# Patient Record
Sex: FEMALE | Race: WHITE | NOT HISPANIC OR LATINO | Employment: STUDENT | URBAN - METROPOLITAN AREA
[De-identification: names, ages, dates, MRNs, and addresses within clinical notes are randomized per-mention and may not be internally consistent; named-entity substitution may affect disease eponyms.]

---

## 2017-01-17 ENCOUNTER — HOSPITAL ENCOUNTER (EMERGENCY)
Facility: OTHER | Age: 22
Discharge: HOME | End: 2017-01-17
Attending: EMERGENCY MEDICINE
Payer: COMMERCIAL

## 2017-01-17 VITALS
OXYGEN SATURATION: 95 % | BODY MASS INDEX: 29.8 KG/M2 | WEIGHT: 220 LBS | TEMPERATURE: 98 F | HEART RATE: 90 BPM | HEIGHT: 72 IN | SYSTOLIC BLOOD PRESSURE: 126 MMHG | RESPIRATION RATE: 18 BRPM | DIASTOLIC BLOOD PRESSURE: 78 MMHG

## 2017-01-17 DIAGNOSIS — J02.9 PHARYNGITIS, UNSPECIFIED ETIOLOGY: Primary | ICD-10-CM

## 2017-01-17 LAB
B-HCG UR QL: NEGATIVE
CTP QC/QA: YES

## 2017-01-17 PROCEDURE — 99283 EMERGENCY DEPT VISIT LOW MDM: CPT | Mod: 25

## 2017-01-17 PROCEDURE — 81025 URINE PREGNANCY TEST: CPT | Performed by: EMERGENCY MEDICINE

## 2017-01-17 PROCEDURE — 63600175 PHARM REV CODE 636 W HCPCS: Performed by: EMERGENCY MEDICINE

## 2017-01-17 PROCEDURE — 96372 THER/PROPH/DIAG INJ SC/IM: CPT

## 2017-01-17 RX ORDER — DEXAMETHASONE SODIUM PHOSPHATE 4 MG/ML
8 INJECTION, SOLUTION INTRA-ARTICULAR; INTRALESIONAL; INTRAMUSCULAR; INTRAVENOUS; SOFT TISSUE
Status: COMPLETED | OUTPATIENT
Start: 2017-01-17 | End: 2017-01-17

## 2017-01-17 RX ORDER — BUSPIRONE HYDROCHLORIDE 15 MG/1
15 TABLET ORAL 3 TIMES DAILY
COMMUNITY
End: 2018-02-25

## 2017-01-17 RX ORDER — ESCITALOPRAM OXALATE 20 MG/1
20 TABLET ORAL DAILY
COMMUNITY
End: 2018-02-25

## 2017-01-17 RX ADMIN — DEXAMETHASONE SODIUM PHOSPHATE 8 MG: 4 INJECTION, SOLUTION INTRAMUSCULAR; INTRAVENOUS at 11:01

## 2017-01-17 NOTE — ED PROVIDER NOTES
Encounter Date: 1/17/2017    SCRIBE #1 NOTE: I, Elena Schmitz, am scribing for, and in the presence of, Dr. Gray.       History     Chief Complaint   Patient presents with    Sore Throat     pt reports waking up this morning with a sore throat; denies any cough; pt reports being an  and has to perform tonight and wanted to make sure she didn't have strep or anything else     Review of patient's allergies indicates:  No Known Allergies  HPI Comments: Time seen by provider: 10:28 AM    This is a 21 y.o. female who presents with complaint of sore throat. The pain began upon waking four hours ago and has been constant since. She rates the pain 2 or 3/10. She reports using a salt gurgle, hot tea, and honey with some relief. She denies taking ibuprofen or aspirin for concern of vocal hemorrhage. She is an , and she is performing for the next three nights. She is concerned she may have strep throat because she had multiple episodes of it as a child. She complains of associated mild headache but denies fever and cough. She took antibiotics for a sinus infection one month ago. She reports sick contacts with her boyfriend.     The history is provided by the patient.     Past Medical History   Diagnosis Date    Anxiety     Bipolar 1 disorder      No past medical history pertinent negatives.  Past Surgical History   Procedure Laterality Date    Temporomandibular joint surgery       History reviewed. No pertinent family history.  Social History   Substance Use Topics    Smoking status: Never Smoker    Smokeless tobacco: None    Alcohol use Yes     Review of Systems   Constitutional: Negative for fever.   HENT: Positive for sore throat.    Respiratory: Negative for cough.    Cardiovascular: Negative for chest pain.   Gastrointestinal: Negative for abdominal pain, nausea and vomiting.   Endocrine: Negative for polyuria.   Genitourinary: Negative for dysuria.   Musculoskeletal: Negative for myalgias.    Skin: Negative for rash.   Neurological: Positive for headaches.       Physical Exam   Initial Vitals   BP Pulse Resp Temp SpO2   01/17/17 0955 01/17/17 0955 01/17/17 0955 01/17/17 0955 01/17/17 0955   137/81 99 18 98.4 °F (36.9 °C) 96 %     Physical Exam    Nursing note and vitals reviewed.  Constitutional: She appears well-developed and well-nourished. She is not diaphoretic. No distress.   HENT:   Head: Normocephalic and atraumatic.   Right Ear: External ear normal.   Left Ear: External ear normal.   Nose: Nose normal.   Mouth/Throat: No oropharyngeal exudate.   Erythema and significant cobblestoning of the posterior oropharynx. Midline uvula.   Eyes: Conjunctivae and EOM are normal.   Neck: Normal range of motion.   Cardiovascular: Normal rate, regular rhythm and normal heart sounds. Exam reveals no gallop and no friction rub.    No murmur heard.  Pulmonary/Chest: Breath sounds normal. No respiratory distress. She has no wheezes. She has no rhonchi. She has no rales.   Abdominal: Soft. There is no tenderness. There is no rebound and no guarding.   Musculoskeletal: Normal range of motion. She exhibits no edema or tenderness.   Lymphadenopathy:     She has no cervical adenopathy.   Neurological: She is alert and oriented to person, place, and time.   Skin: Skin is warm and dry. No rash noted. No pallor.   Psychiatric: She has a normal mood and affect. Thought content normal.         ED Course   Procedures  Labs Reviewed   POCT URINE PREGNANCY             Medical Decision Making:   ED Management:  Urgent evaluation a 21-year-old female  with complaint of sore throat ×2 hours.  Vital signs are benign, afebrile.  On exam there is no evidence of tonsillitis or peritonsillar abscess, only mild erythema and mild cobblestoning.  I advised her that this is unlikely to be strep throat given her normal vital signs and exam.  She agrees.  She was to avoid ibuprofen for fear of vocal cord hemorrhage due to her  occupation, and was instead treated with Tylenol prescription.  She was given a dexamethasone shot here which is what she desired.  She is discharged in good condition and encouraged to return for any new or worsening symptoms.            Scribe Attestation:   Scribe #1: I performed the above scribed service and the documentation accurately describes the services I performed. I attest to the accuracy of the note.    Attending Attestation:           Physician Attestation for Scribe:  Physician Attestation Statement for Scribe #1: I, Dr. Gray, reviewed documentation, as scribed by Elena Schmitz in my presence, and it is both accurate and complete.                 ED Course     Clinical Impression:     1. Pharyngitis, unspecified etiology             Carisa Gray MD  01/19/17 9861

## 2017-01-17 NOTE — ED AVS SNAPSHOT
[facility]  [address]  [facility] [address]               Candace Tsang   2017 10:07 AM   ED    Description:  Female : 1995   Department:  [facility]           Your Care was Coordinated By:     Provider Role From To    Carisa Gray MD Attending Provider 17 1011 --      Reason for Visit     Sore Throat           Diagnoses this Visit        Comments    Pharyngitis, unspecified etiology    -  Primary       ED Disposition     None           To Do List           Follow-up Information     Schedule an appointment as soon as possible for a visit with [facility].    Why:  As needed    Contact information:    [address]  [facility] [address]  [phone]          Follow up with [facility].    Specialty:  Emergency Medicine    Why:  As needed, If symptoms worsen    Contact information:    [address]  [facility] [address] [phone]      [facility]     [facility] Care [facility] -  Assistance  Registered nurses in the [facility] provide clinical advisement, health education, appointment booking, and other advisory services.  Call for this free service at [phone].             Medications           Message regarding Medications     Verify the changes and/or additions to your medication regime listed below are the same as discussed with your clinician today.  If any of these changes or additions are incorrect, please notify your healthcare provider.        These medications were administered today        Dose Freq    dexamethasone injection 8 mg 8 mg ED 1 Time    Sig: Inject 2 mLs (8 mg total) into the muscle ED 1 Time.    Class: Normal    Route: Intramuscular           Verify that the below list of medications is an accurate representation of the medications you are currently taking.  If none reported, the list may be blank. If incorrect,  "please contact your healthcare provider. Carry this list with you in case of emergency.           Current Medications     busPIRone (BUSPAR) 15 MG tablet Take 15 mg by mouth 3 (three) times daily.    escitalopram oxalate (LEXAPRO) 20 MG tablet Take 20 mg by mouth once daily.    etonogestrel (IMPLANON) 68 mg Impl by Subdermal route.           Clinical Reference Information           Your Vitals Were     BP Pulse Temp Resp Height Weight    137/81 (BP Location: Left arm, Patient Position: Sitting) 99 98.4 °F (36.9 °C) (Oral) 18 6' 1" (1.854 m) 99.8 kg (220 lb)    SpO2 BMI             96% 29.03 kg/m2         Allergies as of 1/17/2017     No Known Allergies      Immunizations Administered on Date of Encounter - 1/17/2017     None      ED Micro, Lab, POCT     Start Ordered       Status Ordering Provider    01/17/17 1055 01/17/17 1054  POCT urine pregnancy  Once      Final result       ED Imaging Orders     None      Discharge References/Attachments     SORE THROATS, SELF-CARE FOR (ENGLISH)    SORE THROAT, WHEN YOU HAVE A (ENGLISH)      [facility] Sign-Up     Activating your [facility] account is as easy as 1-2-3!     1) Visit [email or web address], select Sign Up Now, enter this activation code and your date of birth, then select Next.  [email or web address]  Expires: 3/3/2017 11:17 AM      2) Create a username and password to use when you visit [facility] in the future and select a security question in case you lose your password and select Next.    3) Enter your e-mail address and click Sign Up!    Additional Information  If you have questions, please e-mail [email or web address] or call [phone] to talk to our [facility] staff. Remember, [facility] is NOT to be used for urgent needs. For medical emergencies, dial 911.          [facility] complies with applicable Federal civil rights laws and does not discriminate on the basis of race, color, national origin, age, disability, or sex.        Language Assistance " Services     ATTENTION: Language assistance services are available, free of charge. Please call [phone].      ATENCIÓN: Si habla español, tiene a benito disposición servicios gratuitos de asistencia lingüística. Llame al [phone].     CHÚ Ý: N?u b?n nói Ti?ng Vi?t, có các d?ch v? h? tr? ngôn ng? mi?n phí dành cho b?n. G?i s? [phone].

## 2017-01-17 NOTE — ED TRIAGE NOTES
Pt c/o feeling throat & tonsillar swelling upon waking up at 630 this morning. Pt has tried hot fluids with no relief.

## 2017-01-17 NOTE — ED NOTES
Patient Identifiers for Candace Tsang checked and correct  LOC: The patient is awake, alert and aware of environment with an appropriate affect, the patient is oriented x 3 and speaking appropriate.  APPEARANCE: Patient resting comfortably and in no acute distress. The patient is clean and well groomed. The patient's clothing is properly fastened.  SKIN: The skin is warm and dry. The patient has normal skin turgor and moist mucus membranes.  Musculoskeletal :  Normal range of motion noted. Moves all extremities well.  RESPIRATORY: Airway is open and patent, respirations are spontaneous, patient has a normal effort and rate.   PULSES: 2+ radial & pedal pulses, symmetrical in all extremities.  ENT: Tonsillar erythema observed.  Will continue to monitor

## 2017-05-11 ENCOUNTER — HOSPITAL ENCOUNTER (EMERGENCY)
Facility: OTHER | Age: 22
Discharge: HOME | End: 2017-05-11
Attending: EMERGENCY MEDICINE
Payer: COMMERCIAL

## 2017-05-11 VITALS
RESPIRATION RATE: 20 BRPM | SYSTOLIC BLOOD PRESSURE: 114 MMHG | OXYGEN SATURATION: 96 % | TEMPERATURE: 98 F | DIASTOLIC BLOOD PRESSURE: 66 MMHG | BODY MASS INDEX: 25.73 KG/M2 | HEART RATE: 84 BPM | WEIGHT: 190 LBS | HEIGHT: 72 IN

## 2017-05-11 DIAGNOSIS — S93.401A SPRAIN OF RIGHT ANKLE, UNSPECIFIED LIGAMENT, INITIAL ENCOUNTER: Primary | ICD-10-CM

## 2017-05-11 DIAGNOSIS — T14.90XA TRAUMA: ICD-10-CM

## 2017-05-11 PROCEDURE — 25000003 PHARM REV CODE 250: Performed by: EMERGENCY MEDICINE

## 2017-05-11 PROCEDURE — 99283 EMERGENCY DEPT VISIT LOW MDM: CPT

## 2017-05-11 RX ORDER — IBUPROFEN 800 MG/1
800 TABLET ORAL 3 TIMES DAILY PRN
Qty: 20 TABLET | Refills: 0 | Status: SHIPPED | OUTPATIENT
Start: 2017-05-11 | End: 2019-11-19

## 2017-05-11 RX ORDER — IBUPROFEN 400 MG/1
800 TABLET ORAL
Status: COMPLETED | OUTPATIENT
Start: 2017-05-11 | End: 2017-05-11

## 2017-05-11 RX ADMIN — IBUPROFEN 800 MG: 400 TABLET, FILM COATED ORAL at 02:05

## 2017-05-11 NOTE — ED NOTES
Pt c/o R ankle pain after rolling her ankle when stepping off a curb at a bar. Pt states she heard and felt a crunch. Pt was not able to bear weight after the incident. No LOC. +ETOH. Pt is A & O x 3, denies SOB, fever, chills and N/V/D. Skin is warm, dry and pink. VSS. JOSE x 3mm. BBS- CTA. Abd- SNT. +swelling w/out deformity to the R ankle. No discoloration noted. PMS x 4 exts. Bed is locked and in the low position w/ the side rails up and locked for safety. Call bell @ BS. Will continue to monitor closely.

## 2017-05-11 NOTE — ED PROVIDER NOTES
Encounter Date: 5/11/2017    SCRIBE #1 NOTE: I, Leia Curry , am scribing for, and in the presence of, Dr. Gamino.       History     Chief Complaint   Patient presents with    Ankle Pain     reports right ankle pain secondary to tripping on curb at bar, moderate amount of swelling noted, unable to bear weight      Review of patient's allergies indicates:  No Known Allergies  HPI Comments: Time seen by provider: 1:58 AM    This is a 21 y.o. female who presents with complaint of ankle pain. Symptoms began prior to arrival. Onset of symptoms occurred after the patient tripped on a curb. Right ankle pain is described as constant and moderate. She reports associated swelling, but denies fever, chills, nausea, vomiting, abdominal pain, head trauma, numbness, or pain to the rest of the extremity. Pt is unable to bear weight, and applied ice to the ankle with little relief.       The history is provided by the patient.     Past Medical History:   Diagnosis Date    Anxiety     Bipolar 1 disorder      Past Surgical History:   Procedure Laterality Date    TEMPOROMANDIBULAR JOINT SURGERY       No family history on file.  Social History   Substance Use Topics    Smoking status: Never Smoker    Smokeless tobacco: Not on file    Alcohol use Yes     Review of Systems   Constitutional: Negative for chills and fever.   HENT: Negative for congestion and sore throat.         Negative for head trauma.    Eyes: Negative for redness and visual disturbance.   Respiratory: Negative for cough and shortness of breath.    Cardiovascular: Negative for chest pain and palpitations.   Gastrointestinal: Negative for abdominal pain, diarrhea, nausea and vomiting.   Genitourinary: Negative for dysuria.   Musculoskeletal: Negative for back pain.        Positive for right ankle pain with swelling. Negative for pain to the rest of the RLE.   Skin: Negative for rash.   Neurological: Negative for weakness, numbness and headaches.    Psychiatric/Behavioral: Negative for confusion.       Physical Exam   Initial Vitals   BP Pulse Resp Temp SpO2   05/11/17 0104 05/11/17 0104 05/11/17 0104 05/11/17 0104 05/11/17 0104   105/61 98 20 97.9 °F (36.6 °C) 96 %     Physical Exam    Nursing note and vitals reviewed.  Constitutional: She appears well-developed and well-nourished. She is not diaphoretic. No distress.   HENT:   Head: Normocephalic and atraumatic.   Right Ear: External ear normal.   Left Ear: External ear normal.   Eyes: Conjunctivae and EOM are normal. Pupils are equal, round, and reactive to light. Right eye exhibits no discharge. Left eye exhibits no discharge. No scleral icterus.   Neck: Normal range of motion. Neck supple.   Cardiovascular: Normal rate, regular rhythm, normal heart sounds and intact distal pulses. Exam reveals no gallop and no friction rub.    No murmur heard.  Pulses:       Dorsalis pedis pulses are 2+ on the right side   Pulmonary/Chest: Breath sounds normal. No stridor. No respiratory distress. She has no wheezes. She has no rhonchi. She has no rales.   Abdominal: Soft. She exhibits no distension. There is no tenderness. There is no rebound and no guarding.   Musculoskeletal: Normal range of motion. She exhibits edema and tenderness.   Right ankle: Tenderness and swelling to the lateral malleolus. No obvious deformity. Unable to test laxity. No bony tenderness of the mid-foot or proximal fibula. No other trauma.    Neurological: She is alert and oriented to person, place, and time. She has normal strength. No cranial nerve deficit.   Skin: Skin is warm and dry. No rash noted. No erythema. No pallor.   Psychiatric: She has a normal mood and affect. Her behavior is normal. Judgment and thought content normal.         ED Course   Procedures  Labs Reviewed - No data to display   Imaging Results         X-Ray Tibia Fibula 2 View Right (Final result) Result time:  05/11/17 01:39:18    Final result by Luis Bravo MD  (05/11/17 01:39:18)    Impression:      No acute fracture.      Electronically signed by: LUIS BRAVO MD  Date:     05/11/17  Time:    01:39     Narrative:    History: .    RIGHT tibia and fibula 2 views:    No fractures or dislocations.  Unremarkable visualized bony structures.            X-Ray Ankle Complete Right (Final result) Result time:  05/11/17 01:38:49    Final result by Lobo Chaudhari MD (05/11/17 01:38:49)    Impression:       No evidence of a fracture or dislocation of the right ankle.    Soft tissue swelling of the lateral aspect of the right ankle.              Electronically signed by: LOBO CHAUDHARI MD  Date:     05/11/17  Time:    01:38     Narrative:    Exam: [id number]  05/11/17  01:30:56 [id number] ([facility]) : XR ANKLE COMPLETE 3 VIEW RIGHT    Technique:    Frontal, lateral, and oblique radiographs of the right ankle.    Comparison:     None     Findings:      The bone mineralization is within normal limits.  The joint spaces are maintained.  The ankle mortise is intact.  There is nonspecific soft tissue swelling of the lateral aspect of the right ankle.  There is no evidence of a fracture or dislocation.            X-Ray Foot Complete Right (Final result) Result time:  05/11/17 01:41:04    Final result by Luis Bravo MD (05/11/17 01:41:04)    Impression:      No acute fracture.      Electronically signed by: LUIS BRAVO MD  Date:     05/11/17  Time:    01:41     Narrative:    History: .    RIGHT foot 3 views:    No fractures or dislocations.  Unremarkable visualized bony structures.                   X-Rays:   Independently Interpreted Readings:   Other Readings:  Right ankle: No fracture or dislocation.  Right foot: No fracture or dislocation.  Right fibula: No fracture or dislocation.    Medical Decision Making:   Clinical Tests:   Radiological Study: Ordered and Reviewed  ED Management:  21-year-old  presents after she twisted her ankle while walking out of a bar.  She heard a  ""crunch" and is concerned about possible fracture.  Does have significant swelling especially over the lateral malleolus.  Does not tolerate father laxity testing.  X-ray of tib-fib, ankle, midfoot demonstrates no fracture dislocation.  Likely significant sprain.  Place an Aircast and crutches.  Counseled for early mobility, provided with orthopedic follow-up.    I did have an extensive talk regarding signs to return for and need for follow up. Patient expressed understanding and will monitor symptoms closely and follow-up as needed.    CHIKA Gamino M.D.  05/11/2017  5:06 AM              Scribe Attestation:   Scribe #1: I performed the above scribed service and the documentation accurately describes the services I performed. I attest to the accuracy of the note.    Attending Attestation:           Physician Attestation for Scribe:  Physician Attestation Statement for Scribe #1: I, Dr. Gamino, reviewed documentation, as scribed by Leia Curry  in my presence, and it is both accurate and complete.                 ED Course     Clinical Impression:     1. Sprain of right ankle, unspecified ligament, initial encounter    2. Trauma                Kendall Gamino MD  05/11/17 0507    "

## 2017-05-11 NOTE — ED NOTES
Pt advised to look for S/S to L ankle that could leave to further injury, such as numbness, tingling, delayed capillary refill and cool/cold temperature w/ discoloration of blue/purple. Pt states she understands and states she will return to the ER if any of the above occur and do not cease w/in a reasonable amount of time.

## 2018-02-25 ENCOUNTER — HOSPITAL ENCOUNTER (EMERGENCY)
Facility: OTHER | Age: 23
Discharge: HOME | End: 2018-02-25
Attending: EMERGENCY MEDICINE
Payer: COMMERCIAL

## 2018-02-25 VITALS
TEMPERATURE: 99 F | HEART RATE: 89 BPM | WEIGHT: 240 LBS | OXYGEN SATURATION: 96 % | BODY MASS INDEX: 32.51 KG/M2 | SYSTOLIC BLOOD PRESSURE: 111 MMHG | DIASTOLIC BLOOD PRESSURE: 77 MMHG | RESPIRATION RATE: 16 BRPM | HEIGHT: 72 IN

## 2018-02-25 DIAGNOSIS — M54.50 LUMBAR PAIN: ICD-10-CM

## 2018-02-25 DIAGNOSIS — S39.012A STRAIN OF LUMBAR REGION, INITIAL ENCOUNTER: Primary | ICD-10-CM

## 2018-02-25 LAB
B-HCG UR QL: NEGATIVE
CTP QC/QA: YES

## 2018-02-25 PROCEDURE — 25000003 PHARM REV CODE 250: Performed by: PHYSICIAN ASSISTANT

## 2018-02-25 PROCEDURE — 99284 EMERGENCY DEPT VISIT MOD MDM: CPT | Mod: 25

## 2018-02-25 PROCEDURE — 96372 THER/PROPH/DIAG INJ SC/IM: CPT

## 2018-02-25 PROCEDURE — 81025 URINE PREGNANCY TEST: CPT | Performed by: EMERGENCY MEDICINE

## 2018-02-25 PROCEDURE — 63600175 PHARM REV CODE 636 W HCPCS: Performed by: PHYSICIAN ASSISTANT

## 2018-02-25 RX ORDER — SERTRALINE HYDROCHLORIDE 100 MG/1
100 TABLET, FILM COATED ORAL DAILY
COMMUNITY
End: 2019-11-19

## 2018-02-25 RX ORDER — IBUPROFEN 800 MG/1
800 TABLET ORAL EVERY 6 HOURS PRN
Qty: 20 TABLET | Refills: 0 | Status: SHIPPED | OUTPATIENT
Start: 2018-02-25 | End: 2019-11-19

## 2018-02-25 RX ORDER — METHYLPREDNISOLONE 4 MG/1
TABLET ORAL
Qty: 1 PACKAGE | Refills: 0 | Status: SHIPPED | OUTPATIENT
Start: 2018-02-25 | End: 2018-03-18

## 2018-02-25 RX ORDER — KETOROLAC TROMETHAMINE 30 MG/ML
30 INJECTION, SOLUTION INTRAMUSCULAR; INTRAVENOUS
Status: COMPLETED | OUTPATIENT
Start: 2018-02-25 | End: 2018-02-25

## 2018-02-25 RX ORDER — PREDNISONE 20 MG/1
60 TABLET ORAL
Status: COMPLETED | OUTPATIENT
Start: 2018-02-25 | End: 2018-02-25

## 2018-02-25 RX ORDER — ONDANSETRON 4 MG/1
4 TABLET, ORALLY DISINTEGRATING ORAL
Status: COMPLETED | OUTPATIENT
Start: 2018-02-25 | End: 2018-02-25

## 2018-02-25 RX ORDER — QUETIAPINE FUMARATE 25 MG/1
25 TABLET, FILM COATED ORAL NIGHTLY
COMMUNITY
End: 2020-02-06 | Stop reason: DRUGHIGH

## 2018-02-25 RX ADMIN — PREDNISONE 60 MG: 20 TABLET ORAL at 11:02

## 2018-02-25 RX ADMIN — KETOROLAC TROMETHAMINE 30 MG: 30 INJECTION, SOLUTION INTRAMUSCULAR at 12:02

## 2018-02-25 RX ADMIN — ONDANSETRON 4 MG: 4 TABLET, ORALLY DISINTEGRATING ORAL at 12:02

## 2018-02-25 NOTE — ED TRIAGE NOTES
Pt c/o bilateral lower back pain which started at 11 pm last night. Pt reports leaning down to put a tea pot on the table & feeling acute sharp back pain with intermittent lower extremity & foot numbness. Pt reports numbness has resolved. Pt reports pain exacerbated with movement.

## 2018-02-25 NOTE — ED NOTES
Patient Identifiers for Candace Tsang checked and correct  LOC: The patient is awake, alert and aware of environment with an appropriate affect, the patient is oriented x 3 and speaking appropriate.  APPEARANCE: Patient resting comfortably and in no acute distress. The patient is clean and well groomed. The patient's clothing is properly fastened.  SKIN: The skin is warm and dry. The patient has normal skin turgor and moist mucus membranes. No rashes or lesions upon observation. Skin Intact , no breakdown noted.  Musculoskeletal :  Decreased range of motion noted upon lumbar extension & flexion & bilateral leg raises secondary to pain.. No swelling. Palpation tenderness noted in the bilateral lumbar region.  RESPIRATORY: Airway is open and patent, respirations are spontaneous, patient has a normal effort and rate.   PULSES: 2+ radial pulses, symmetrical.  Will continue to monitor

## 2018-02-25 NOTE — ED NOTES
"Pt states," I am currently in drug and alcohol recovery programs and I don't want any Narcotics".   "

## 2018-02-25 NOTE — ED PROVIDER NOTES
"Encounter Date: 2/25/2018       History     Chief Complaint   Patient presents with    Back Pain     + bilateral lower back apin " I have a herniated disc and I pulled something in it yesterday ". Denies numbness/tingling to extremities.      Patient is 22 year old female who presents with complaints of back pain that started immediately upon leaning forward yesterday.  She reports she was attempting to place a teapot on her table when she had sudden onset of exploding back pain that initially radiated down both legs but seemed to resolve over time.  She took ibuprofen with no significant relief in symptoms.  Reports shooting pain only with position changes but once at rest she has no lower extremity symptoms at all.  She is not describing bladder or bowel incontinence but admits that she had to sit on the toilet for about an hour this morning because she was having difficulty telling if she needed to urinate or not.  She admits she is able to force urine out especially when she provided a urine sample here in the emergency department but reports that "it's just weird".  She has no report of bleeding, fever, chills, nausea but does endorse one episode of vomiting in the middle the night that she feels was related to pain.  She has no history of IV drug abuse despite her history of alcohol and pill abuse.  She is currently sober and is reluctant to take any type of sedating medications including muscle relaxers.  She is currently unaccompanied in the emergency department.  Of note she has no history of evaluation by spine specialist, MRI, lumbar surgery.          Review of patient's allergies indicates:  No Known Allergies  Past Medical History:   Diagnosis Date    Anxiety     Bipolar 1 disorder      Past Surgical History:   Procedure Laterality Date    TEMPOROMANDIBULAR JOINT SURGERY       History reviewed. No pertinent family history.  Social History   Substance Use Topics    Smoking status: Never Smoker    " Smokeless tobacco: Never Used    Alcohol use Yes     Review of Systems   Constitutional: Negative for fever.   HENT: Negative for sore throat.    Respiratory: Negative for shortness of breath.    Cardiovascular: Negative for chest pain.   Gastrointestinal: Negative for nausea.   Genitourinary: Negative for dysuria.   Musculoskeletal: Positive for back pain.   Skin: Negative for rash.   Neurological: Negative for weakness.   Hematological: Does not bruise/bleed easily.       Physical Exam     Initial Vitals [02/25/18 1059]   BP Pulse Resp Temp SpO2   (!) 189/112 95 18 98.7 °F (37.1 °C) 96 %      MAP       137.67         Physical Exam    Nursing note and vitals reviewed.  Constitutional: She appears well-developed and well-nourished. She is not diaphoretic. No distress.   Healthy appearing female in no acute distress or apparent pain. She makes good eye contact, speaks in clear full sentences and ambulates with ease.    HENT:   Head: Normocephalic and atraumatic.   Eyes: Conjunctivae and EOM are normal. Pupils are equal, round, and reactive to light. Right eye exhibits no discharge. Left eye exhibits no discharge. No scleral icterus.   Neck: Normal range of motion.   Cardiovascular: Normal rate, regular rhythm, normal heart sounds and intact distal pulses. Exam reveals no gallop and no friction rub.    No murmur heard.  Pulmonary/Chest: Breath sounds normal. She has no wheezes. She has no rhonchi. She has no rales.   Abdominal: Soft. Bowel sounds are normal. There is no tenderness. There is no rebound and no guarding.   Genitourinary:   Genitourinary Comments: Normal rectal tone with normal sensation to perineal region     Musculoskeletal: Normal range of motion. She exhibits no edema or tenderness.   Lymphadenopathy:     She has no cervical adenopathy.   Neurological: She is alert and oriented to person, place, and time. She has normal strength.   There is no C or T midline bony TTP crepitus or step-offs.   There is  entire Lumbar midline TTP without bony land jude abnormalities including crepitus or step-offs. No overlying skin changes.   No clonus  Normal patella DTR   Skin: Skin is warm. Capillary refill takes less than 2 seconds. No rash and no abscess noted. No erythema.   Psychiatric: She has a normal mood and affect. Her behavior is normal. Thought content normal.         ED Course   Procedures  Labs Reviewed   POCT URINE PREGNANCY   POCT URINE PREGNANCY        Imaging Results          X-Ray Lumbar Spine Ap And Lateral (Final result)  Result time 02/25/18 12:02:02    Final result by Kelly Mccartney MD (02/25/18 12:02:02)                 Impression:     As above      Electronically signed by: Kelly Mccartney MD  Date:     02/25/18  Time:    12:02              Narrative:    2 views, 3 radiographs.  Image quality degraded by patient body habitus.  Vertebral body alignment and height is satisfactory.  Disc spaces are maintained without significant osteophytic spurring.                                 Medical Decision Making:   ED Management:  Urgent evaluation of 22-year-old female who presents with complaints most consistent with lumbar strain.  She is afebrile, nontoxic appearing, hemodynamically stable.  Physical exam outlined above and reveals midline and paraspinal musculature tenderness to palpation over lumbar region.  X-ray is unremarkable.  She has no neuro deficits and has relief with anti-inflammatory and steroids.  I've considered but do not suspect vertebral fracture, cord compression, cauda equina syndrome, spinal infection.  Patient adamantly refuses muscle relaxer narcotic pain medication.  We'll discharge with the same regimen given here in the emergency department and encourage follow-up with back and spine clinic within a week.  She is educated on return precautions and verbalizes understanding.  She is amenable to plan.  Case discussed with attending who agrees with plan.  Other:   I have discussed this  case with another health care provider.       <> Summary of the Discussion: Gray                      Clinical Impression:   The primary encounter diagnosis was Strain of lumbar region, initial encounter. A diagnosis of Lumbar pain was also pertinent to this visit.                           Eun Mesa PA-C  02/25/18 9988

## 2019-10-07 ENCOUNTER — TELEPHONE (OUTPATIENT)
Dept: OBSTETRICS AND GYNECOLOGY | Facility: CLINIC | Age: 24
End: 2019-10-07

## 2019-10-07 NOTE — TELEPHONE ENCOUNTER
----- Message from Tamia Cantu sent at 10/7/2019  8:52 AM CDT -----  Contact: pt  Name of Who is Calling: TERE AVILA [[id number]]      What is the request in detail: pt would like to check the status of her Nexplanon. Please contact to further discuss and advise.      Can the clinic reply by [facility]: n      What Number to Call Back if not in [facility]: [phone]        Spoke with patient regarding her Nexplanon approval. Inform patient it was denied due to no insurance. Patient states she has new insurance. Inform patient to call the main line to update insurance and I will submit for an approval again once its updated. Patient verbalized and understand

## 2019-10-08 ENCOUNTER — TELEPHONE (OUTPATIENT)
Dept: OBSTETRICS AND GYNECOLOGY | Facility: CLINIC | Age: 24
End: 2019-10-08

## 2019-10-08 DIAGNOSIS — Z30.9 ENCOUNTER FOR CONTRACEPTIVE MANAGEMENT, UNSPECIFIED TYPE: Primary | ICD-10-CM

## 2019-10-08 NOTE — TELEPHONE ENCOUNTER
----- Message from Gay Dupree sent at 10/7/2019 11:30 AM CDT -----  Contact: Pt    Name of Who is Calling:TERE AVILA [[id number]]    What is the request in detail: Patient would like a call back to schedule remove and  Replace IUD , patient has new insurance Please contact to further discuss and advise    Can the clinic reply by [facility]: No    [facility]: [phone]              Spoke with patient to inform patient I put the referral in to see about getting approval for the Nexplanon. Inform patient once I receive the approval I will let patient know. Patient verbalized and understand

## 2019-10-11 ENCOUNTER — TELEPHONE (OUTPATIENT)
Dept: OBSTETRICS AND GYNECOLOGY | Facility: CLINIC | Age: 24
End: 2019-10-11

## 2019-10-11 NOTE — TELEPHONE ENCOUNTER
Informed patient that order is still pending in the system. Will inform Piyush, via note on her desk, that patient wanted to know. Patient was satisfied and understood.

## 2019-10-11 NOTE — TELEPHONE ENCOUNTER
----- Message from Bulmaro Kamara sent at 10/11/2019 10:49 AM CDT -----  Contact: TERE AVILA [[id number]]  Name of Who is Calling: TERE AVILA [[id number]]      What is the request in detail: Would like to speak with staff in regards to nexplanon approval and scheduling. Please advise      Can the clinic reply by [facility]: no      What Number to Call Back if not in [facility]: [phone]

## 2019-10-21 ENCOUNTER — TELEPHONE (OUTPATIENT)
Dept: OBSTETRICS AND GYNECOLOGY | Facility: CLINIC | Age: 24
End: 2019-10-21

## 2019-10-21 NOTE — TELEPHONE ENCOUNTER
----- Message from Radha Lorenz sent at 10/21/2019  1:54 PM CDT -----  Contact: TERE AVILA [[id number]]  Name of Who is Calling:  TERE AVILA [[id number]]      What is the request in detail:     Patient called requesting the status of her Nexplanon order so that she can schedule the insertion.  Please give a call back at your earliest convenience and further advise.  Thanks!       Reply by [facility]: no    Call Back: TERE AVILA / # [phone]            Left message for patient to give the office a call back. Patient is approved to receive the Nexplanon.

## 2019-10-21 NOTE — TELEPHONE ENCOUNTER
----- Message from Makenna Bazan sent at 10/21/2019  4:02 PM CDT -----  Contact: TERE AVILA [[id number]]  Type:  Patient Returning Call    Who Called: TERE AVILA [[id number]]    Who Left Message for Patient: Piyush Ruby    Does the patient know what this is regarding?:Y     Best Call Back Number:[phone]    Additional Information:         Returned patient call, inform patient Nexplanon is approved. Schedule patient for removal and insertion. Patient verbalized and understand

## 2019-10-24 ENCOUNTER — PROCEDURE VISIT (OUTPATIENT)
Dept: OBSTETRICS AND GYNECOLOGY | Facility: CLINIC | Age: 24
End: 2019-10-24
Attending: OBSTETRICS & GYNECOLOGY
Payer: COMMERCIAL

## 2019-10-24 VITALS
BODY MASS INDEX: 34.49 KG/M2 | DIASTOLIC BLOOD PRESSURE: 80 MMHG | WEIGHT: 254.63 LBS | SYSTOLIC BLOOD PRESSURE: 118 MMHG | HEIGHT: 72 IN

## 2019-10-24 DIAGNOSIS — Z30.017 NEXPLANON INSERTION: ICD-10-CM

## 2019-10-24 DIAGNOSIS — Z30.46 NEXPLANON REMOVAL: Primary | ICD-10-CM

## 2019-10-24 PROCEDURE — 11983 REMOVE/INSERT DRUG IMPLANT: CPT | Mod: S$GLB,,, | Performed by: OBSTETRICS & GYNECOLOGY

## 2019-10-24 PROCEDURE — 11983 PR REMOVAL W/ REINSERT DRUG IMPLANT DEVICE: ICD-10-PCS | Mod: S$GLB,,, | Performed by: OBSTETRICS & GYNECOLOGY

## 2019-10-25 NOTE — PROCEDURES
"Insertion of Nexplanon  Date/Time: 10/24/2019 2:15 PM  Performed by: Etelvina Barclay DO  Authorized by: Etelvina Barclay DO     Consent obtained:  Written  Consent given by:  Patient  Patient questions answered: no    Patient agrees, verbalizes understanding, and wants to proceed: no    Educational handouts given: no    Instructions and paperwork completed: no    Pre-procedure timeout performed: yes    Prepped with: alcohol 70% and povidone-iodine    Local anesthetic:  Lidocaine 1%  The site was cleaned and prepped in a sterile fashion: yes    Left/right:  Left   68 mg etonogestrel 68 mg  Preloaded Implanon trocar was placed subdermally: yes    Visualization of implant was obtained: yes    Nexplanon was inserted and trocar removed: yes    Visualization of notch in stilette and palpitation of device: yes    Palpitation confirms placement by provider and patient: yes    Site was closed with steri-strips and pressure bandage applied: yes      Nexplanon removal 2/2  device.      PE:  /80   Ht 6' 1" (1.854 m)   Wt 115.5 kg (254 lb 10.1 oz)   LMP 2019   BMI 33.59 kg/m²       GEN: AAO x 3, NAD  EXT: warm, dry, moves all ext normally  PSYCH: normal mood and affect    UPT negative.    PROCEDURE IN DETAIL:    Nexplanon palpated through the skin.  Area wiped with rubbing alcohol.  3 cc of 1% lidocaine without epinephrine was injected in the subcutaneous tissue.  The area was prepped with betadine.  An incision was made with an 11 blade scalpel.  The tip of the device was then visualized and pressure applied to the end of the device and it then protruded through the incision site.  The Nexplanon was grasped with curved hemostat.  It was removed intact.  A steristrip was placed over the incision, a pressure bandage with 4x4 and coban was placed over the incision site. Pt tolerated the procedure well.     A/P  Encounter for nexplanon removal   Device removed intact  Precautions for bleeding, bruising, " pain and infection reviewed  F/U for well woman

## 2019-11-19 ENCOUNTER — OFFICE VISIT (OUTPATIENT)
Dept: INTERNAL MEDICINE | Facility: CLINIC | Age: 24
End: 2019-11-19
Payer: COMMERCIAL

## 2019-11-19 VITALS
WEIGHT: 259.06 LBS | BODY MASS INDEX: 35.09 KG/M2 | DIASTOLIC BLOOD PRESSURE: 76 MMHG | SYSTOLIC BLOOD PRESSURE: 106 MMHG | OXYGEN SATURATION: 97 % | HEIGHT: 72 IN | HEART RATE: 94 BPM

## 2019-11-19 DIAGNOSIS — F41.9 ANXIETY: ICD-10-CM

## 2019-11-19 DIAGNOSIS — F31.9 BIPOLAR 1 DISORDER: ICD-10-CM

## 2019-11-19 DIAGNOSIS — Z13.220 SCREENING FOR LIPID DISORDERS: ICD-10-CM

## 2019-11-19 DIAGNOSIS — F10.21 HISTORY OF ALCOHOLISM: ICD-10-CM

## 2019-11-19 DIAGNOSIS — F19.11 HISTORY OF DRUG ABUSE IN REMISSION: ICD-10-CM

## 2019-11-19 DIAGNOSIS — Z20.2 EXPOSURE TO STD: ICD-10-CM

## 2019-11-19 DIAGNOSIS — Z13.1 SCREENING FOR DIABETES MELLITUS: ICD-10-CM

## 2019-11-19 DIAGNOSIS — Z11.3 SCREEN FOR SEXUALLY TRANSMITTED DISEASES: ICD-10-CM

## 2019-11-19 DIAGNOSIS — Z00.00 ANNUAL PHYSICAL EXAM: Primary | ICD-10-CM

## 2019-11-19 PROCEDURE — 99999 PR PBB SHADOW E&M-EST. PATIENT-LVL III: ICD-10-PCS | Mod: PBBFAC,,, | Performed by: FAMILY MEDICINE

## 2019-11-19 PROCEDURE — 99385 PREV VISIT NEW AGE 18-39: CPT | Mod: S$GLB,,, | Performed by: FAMILY MEDICINE

## 2019-11-19 PROCEDURE — 99999 PR PBB SHADOW E&M-EST. PATIENT-LVL III: CPT | Mod: PBBFAC,,, | Performed by: FAMILY MEDICINE

## 2019-11-19 PROCEDURE — 99385 PR PREVENTIVE VISIT,NEW,18-39: ICD-10-PCS | Mod: S$GLB,,, | Performed by: FAMILY MEDICINE

## 2019-11-19 RX ORDER — SERTRALINE HYDROCHLORIDE 50 MG/1
150 TABLET, FILM COATED ORAL DAILY
Refills: 2 | COMMUNITY
Start: 2019-11-09 | End: 2020-02-06 | Stop reason: DRUGHIGH

## 2019-11-19 NOTE — PROGRESS NOTES
"Subjective:       Patient ID: Candace Tsang is a 24 y.o. female.    Chief Complaint: Establish Care    HPI  This patient is new to me.   Candace Tsang is a 24 y.o. year old female with bipolar 1 disorder, anxiety, obesity, history of alcoholism, history of drug abuse, current vaping who presents today to establish care.    Bipolar 1 disorder - patient currently see Psychiatry at [facility].  She also sees a counselor every week. She has an appointment with psychiatry here in February.  She currently takes Seroquel 25 mg nightly and sertraline 150 mg daily.  Patient reports that her symptoms are fairly well controlled.  She does still suffer from depression.  She has not had manic episodes in a while.  She does mention chronic passive suicidal ideation.  She has no plans to harm herself.  Denies previous suicide attempts.    Patient has a history of alcoholism.  She has been sober for a little over 2 years.  She also used to abuse multiple substances and has been sober from that for the past 2 years as well.  Reports that she has to use all pills she could get her hands on, such as Valium and narcotics.  She also used cocaine and "any other drug that you could get addicted to".  She currently follows with  and has a sponsor.    Patient also complains of sinus infections that occur every few months.  Mentions that about 5 days ago she developed left-sided sinus congestion, pain in pressure.  She uses a Neti pot, which normally helps.  She also takes Mucinex.  Denies fever, but reports that she never develops fevers.    OB/GYN History     LMP: 19  Sexually active:  Contraception: Nexplanon - placed Oct 2019     Health Maintenance  Pap smear: many years ago - normal.  Has upcoming appointment for Pap smear  Mammogram: n/a  Colon Cancer Screening: n/a  DEXA: n/a  Hepatitis C screening: n/a  Flu vaccine: due  Tetanus vaccine: 2015 per patient possibly   PNA vaccine: " "n/a  Shingles vaccine: n/a  Had HPV series per patient   No vaccine data in LINKS    I personally reviewed Past Medical History, Past Surgical History, Social History, and Family History    Review of Systems   Constitutional: Negative for chills, fatigue, fever and unexpected weight change.   HENT: Positive for congestion, sinus pressure and sinus pain. Negative for hearing loss, rhinorrhea and sore throat.    Eyes: Negative for visual disturbance.   Respiratory: Negative for cough, shortness of breath and wheezing.    Cardiovascular: Negative for chest pain, palpitations and leg swelling.   Gastrointestinal: Negative for abdominal pain, constipation, diarrhea, nausea and vomiting.   Genitourinary: Negative for dysuria, frequency, menstrual problem and urgency.   Musculoskeletal: Negative for arthralgias and myalgias.   Skin: Negative for rash.   Neurological: Negative for dizziness, syncope and headaches.   Psychiatric/Behavioral: Positive for dysphoric mood. Negative for sleep disturbance. The patient is not nervous/anxious.        Objective:      Vitals:    11/19/19 1550   BP: 106/76   Pulse: 94   SpO2: 97%   Weight: 117.5 kg (259 lb 0.7 oz)   Height: 6' 1" (1.854 m)     Physical Exam   Constitutional: She is oriented to person, place, and time. She appears well-developed and well-nourished. No distress.   HENT:   Head: Normocephalic and atraumatic.   Right Ear: Hearing, tympanic membrane, external ear and ear canal normal.   Left Ear: Hearing, tympanic membrane, external ear and ear canal normal.   Nose: Right sinus exhibits no maxillary sinus tenderness and no frontal sinus tenderness. Left sinus exhibits maxillary sinus tenderness and frontal sinus tenderness.   Mouth/Throat: Oropharynx is clear and moist and mucous membranes are normal. No oropharyngeal exudate.   Eyes: Pupils are equal, round, and reactive to light. Conjunctivae, EOM and lids are normal.   Neck: Normal range of motion. No thyroid mass and no " thyromegaly present.   Cardiovascular: Normal rate, regular rhythm, S1 normal, S2 normal, normal heart sounds and intact distal pulses.   No murmur heard.  No lower extremity edema.    Pulmonary/Chest: Effort normal and breath sounds normal. No respiratory distress.   Abdominal: Soft. Normal appearance and bowel sounds are normal. There is no tenderness.   Lymphadenopathy:     She has no cervical adenopathy.        Right: No supraclavicular adenopathy present.        Left: No supraclavicular adenopathy present.   Neurological: She is alert and oriented to person, place, and time.   Skin: Skin is warm and dry. No rash noted.   Psychiatric: She has a normal mood and affect. Her behavior is normal. Thought content normal.   Nursing note and vitals reviewed.      Assessment:       1. Annual physical exam    2. Screening for diabetes mellitus    3. Screening for lipid disorders    4. Screen for sexually transmitted diseases    5. Exposure to STD    6. Bipolar 1 disorder    7. Anxiety    8. History of alcoholism    9. History of drug abuse in remission        Plan:   Candace was seen today for establish care.    Diagnoses and all orders for this visit:    Annual physical exam  Recommended patient have flu vaccine. Strongly advised patient to stop vaping.   -     CBC auto differential; Future  -     Comprehensive metabolic panel; Future  -     Lipid panel; Future  -     TSH; Future  -     Hepatitis panel, acute; Future  -     HIV 1/2 Ag/Ab (4th Gen); Future  -     RPR; Future    Screening for diabetes mellitus  -     Comprehensive metabolic panel; Future    Screening for lipid disorders  -     Lipid panel; Future    Screen for sexually transmitted diseases  -     Hepatitis panel, acute; Future  -     HIV 1/2 Ag/Ab (4th Gen); Future  -     RPR; Future    Exposure to STD  -     Hepatitis panel, acute; Future  -     HIV 1/2 Ag/Ab (4th Gen); Future  -     RPR; Future    Bipolar 1 disorder  Anxiety  Symptoms are relatively stable.   Continue management per Psychiatry.  Continue current medications.    History of alcoholism  History of drug abuse in remission  Congratulated patient.  Continue with cessation and continue AA.     In terms of her sinus congestion, advised patient to try Mucinex sinus (or Afrin if she can use appropriately and sparingly).  Using Netti pot.  If symptoms worsen or not improved by day 7-10 advised patient to contact for possible antibiotic initiation.  She may need to see ENT if sinus infections are recurrent.

## 2020-01-02 ENCOUNTER — OFFICE VISIT (OUTPATIENT)
Dept: OBSTETRICS AND GYNECOLOGY | Facility: CLINIC | Age: 25
End: 2020-01-02
Attending: OBSTETRICS & GYNECOLOGY
Payer: COMMERCIAL

## 2020-01-02 VITALS
DIASTOLIC BLOOD PRESSURE: 76 MMHG | HEIGHT: 72 IN | SYSTOLIC BLOOD PRESSURE: 104 MMHG | BODY MASS INDEX: 34.28 KG/M2 | WEIGHT: 253.06 LBS

## 2020-01-02 DIAGNOSIS — Z11.3 SCREENING EXAMINATION FOR VENEREAL DISEASE: ICD-10-CM

## 2020-01-02 DIAGNOSIS — Z01.419 WELL WOMAN EXAM WITH ROUTINE GYNECOLOGICAL EXAM: Primary | ICD-10-CM

## 2020-01-02 LAB
C TRACH DNA SPEC QL NAA+PROBE: NOT DETECTED
N GONORRHOEA DNA SPEC QL NAA+PROBE: NOT DETECTED

## 2020-01-02 PROCEDURE — 88175 CYTOPATH C/V AUTO FLUID REDO: CPT

## 2020-01-02 PROCEDURE — 99385 PR PREVENTIVE VISIT,NEW,18-39: ICD-10-PCS | Mod: S$GLB,,, | Performed by: OBSTETRICS & GYNECOLOGY

## 2020-01-02 PROCEDURE — 99385 PREV VISIT NEW AGE 18-39: CPT | Mod: S$GLB,,, | Performed by: OBSTETRICS & GYNECOLOGY

## 2020-01-02 PROCEDURE — 87491 CHLMYD TRACH DNA AMP PROBE: CPT

## 2020-01-02 PROCEDURE — 99999 PR PBB SHADOW E&M-EST. PATIENT-LVL III: CPT | Mod: PBBFAC,,, | Performed by: OBSTETRICS & GYNECOLOGY

## 2020-01-02 PROCEDURE — 99999 PR PBB SHADOW E&M-EST. PATIENT-LVL III: ICD-10-PCS | Mod: PBBFAC,,, | Performed by: OBSTETRICS & GYNECOLOGY

## 2020-01-02 NOTE — PROGRESS NOTES
CC: Well woman exam    Candace Tsang is a 24 y.o. female  presents for well woman exam.  LMP: Patient's last menstrual period was 2019..  No issues, problems, or complaints.  nexplanon in place, doing well with that.  Reports that she did have a long period last month but this was not unexpected.  She has a stable place with alcohol abuse and bipolar disorder, otherwise doing well with no complaints.  She has received the Gardasil vaccine she is due for a Pap smear today.    Past Medical History:   Diagnosis Date    Alcohol abuse     in remission x 2 years    Anxiety     Bipolar 1 disorder     Drug abuse in remission      Past Surgical History:   Procedure Laterality Date    OVARY SURGERY  2018    Diagnostic laparoscopy and drainage of right ovarian cyst     TEMPOROMANDIBULAR JOINT SURGERY      arthrocentesis      Social History     Socioeconomic History    Marital status: Single     Spouse name: Not on file    Number of children: 0    Years of education: Not on file    Highest education level: Not on file   Occupational History     Comment: administration for school for [employer]   Social Needs    Financial resource strain: Not on file    Food insecurity:     Worry: Not on file     Inability: Not on file    Transportation needs:     Medical: Not on file     Non-medical: Not on file   Tobacco Use    Smoking status: Current Every Day Smoker     Years: 9.00     Types: Cigarettes, Vaping with nicotine     Last attempt to quit: 2018     Years since quittin.1    Smokeless tobacco: Never Used    Tobacco comment: currently vapes   Substance and Sexual Activity    Alcohol use: Not Currently    Drug use: Not Currently     Comment: PmH of drug abuse    Sexual activity: Yes     Partners: Male     Birth control/protection: Implant   Lifestyle    Physical activity:     Days per week: Not on file     Minutes per session: Not on file    Stress: Not on file  "  Relationships    Social connections:     Talks on phone: Not on file     Gets together: Not on file     Attends Worship service: Not on file     Active member of club or organization: Not on file     Attends meetings of clubs or organizations: Not on file     Relationship status: Not on file   Other Topics Concern    Not on file   Social History Narrative    Not on file     Family History   Problem Relation Age of Onset    Bipolar disorder Mother     Alcohol abuse Mother     Depression Mother     Multiple sclerosis Father     Bipolar disorder Father     Diabetes Maternal Grandfather     Alcohol abuse Paternal Grandmother     Bipolar disorder Brother     Breast cancer Neg Hx     Colon cancer Neg Hx     Ovarian cancer Neg Hx      OB History        0    Para   0    Term   0       0    AB   0    Living   0       SAB   0    TAB   0    Ectopic   0    Multiple   0    Live Births   0                 /76   Ht 6' 1" (1.854 m)   Wt 114.8 kg (253 lb 1.4 oz)   LMP 2019   BMI 33.39 kg/m²       ROS:  GENERAL: Denies weight gain or weight loss. Feeling well overall.   SKIN: Denies rash or lesions.   HEAD: Denies head injury or headache.   NODES: Denies enlarged lymph nodes.   CHEST: Denies chest pain or shortness of breath.   CARDIOVASCULAR: Denies palpitations or left sided chest pain.   ABDOMEN: No abdominal pain, constipation, diarrhea, nausea, vomiting or rectal bleeding.   URINARY: No frequency, dysuria, hematuria, or burning on urination.  REPRODUCTIVE: See HPI.   BREASTS: The patient performs breast self-examination and denies pain, lumps, or nipple discharge.   HEMATOLOGIC: No easy bruisability or excessive bleeding.   MUSCULOSKELETAL: Denies joint pain or swelling.   NEUROLOGIC: Denies syncope or weakness.   PSYCHIATRIC: Denies depression, anxiety or mood swings.    PHYSICAL EXAM:  APPEARANCE: Well nourished, well developed, in no acute distress.  AFFECT: WNL, alert and " oriented x 3  SKIN: No acne or hirsutism  NECK: Neck symmetric without masses or thyromegaly  NODES: No inguinal, cervical, axillary, or femoral lymph node enlargement  CHEST: Good respiratory effect  ABDOMEN: Soft.  No tenderness or masses.  No hepatosplenomegaly.  No hernias.  BREASTS: Symmetrical, no skin changes or visible lesions.  No palpable masses, nipple discharge bilaterally.  PELVIC: Normal external genitalia without lesions.  Normal hair distribution.  Adequate perineal body, normal urethral meatus.  Vagina moist and well rugated without lesions or discharge.  Cervix pink, without lesions, discharge or tenderness.  No significant cystocele or rectocele.  Bimanual exam shows uterus to be normal size, regular, mobile and nontender.  Adnexa without masses or tenderness.    EXTREMITIES: No edema.    Well woman exam with routine gynecological exam  -     C. trachomatis/N. gonorrhoeae by AMP DNA [facility]; Vagina  -     Liquid-Based Pap Smear, Screening    Screening examination for venereal disease            Patient was counseled today on A.C.S. Pap guidelines and recommendations for yearly pelvic exams, mammograms and monthly self breast exams; to see her PCP for other health maintenance.     No follow-ups on file.

## 2020-01-17 LAB
FINAL PATHOLOGIC DIAGNOSIS: NORMAL
Lab: NORMAL

## 2020-01-20 ENCOUNTER — PATIENT MESSAGE (OUTPATIENT)
Dept: INTERNAL MEDICINE | Facility: CLINIC | Age: 25
End: 2020-01-20

## 2020-02-06 ENCOUNTER — OFFICE VISIT (OUTPATIENT)
Dept: PSYCHIATRY | Facility: CLINIC | Age: 25
End: 2020-02-06
Payer: COMMERCIAL

## 2020-02-06 VITALS
SYSTOLIC BLOOD PRESSURE: 111 MMHG | BODY MASS INDEX: 35.11 KG/M2 | HEART RATE: 87 BPM | HEIGHT: 72 IN | DIASTOLIC BLOOD PRESSURE: 63 MMHG | WEIGHT: 259.25 LBS

## 2020-02-06 DIAGNOSIS — F31.9 BIPOLAR 1 DISORDER: Primary | ICD-10-CM

## 2020-02-06 DIAGNOSIS — F19.11 HISTORY OF DRUG ABUSE IN REMISSION: ICD-10-CM

## 2020-02-06 DIAGNOSIS — F10.21 HISTORY OF ALCOHOLISM: ICD-10-CM

## 2020-02-06 PROCEDURE — 99203 OFFICE O/P NEW LOW 30 MIN: CPT | Mod: S$GLB,,, | Performed by: STUDENT IN AN ORGANIZED HEALTH CARE EDUCATION/TRAINING PROGRAM

## 2020-02-06 PROCEDURE — 3008F PR BODY MASS INDEX (BMI) DOCUMENTED: ICD-10-PCS | Mod: CPTII,S$GLB,, | Performed by: STUDENT IN AN ORGANIZED HEALTH CARE EDUCATION/TRAINING PROGRAM

## 2020-02-06 PROCEDURE — 99203 PR OFFICE/OUTPT VISIT, NEW, LEVL III, 30-44 MIN: ICD-10-PCS | Mod: S$GLB,,, | Performed by: STUDENT IN AN ORGANIZED HEALTH CARE EDUCATION/TRAINING PROGRAM

## 2020-02-06 PROCEDURE — 99999 PR PBB SHADOW E&M-EST. PATIENT-LVL II: CPT | Mod: PBBFAC,,, | Performed by: STUDENT IN AN ORGANIZED HEALTH CARE EDUCATION/TRAINING PROGRAM

## 2020-02-06 PROCEDURE — 3008F BODY MASS INDEX DOCD: CPT | Mod: CPTII,S$GLB,, | Performed by: STUDENT IN AN ORGANIZED HEALTH CARE EDUCATION/TRAINING PROGRAM

## 2020-02-06 PROCEDURE — 99999 PR PBB SHADOW E&M-EST. PATIENT-LVL II: ICD-10-PCS | Mod: PBBFAC,,, | Performed by: STUDENT IN AN ORGANIZED HEALTH CARE EDUCATION/TRAINING PROGRAM

## 2020-02-06 RX ORDER — SERTRALINE HYDROCHLORIDE 50 MG/1
150 TABLET, FILM COATED ORAL DAILY
Qty: 90 TABLET | Refills: 2 | Status: SHIPPED | OUTPATIENT
Start: 2020-02-06 | End: 2020-04-09

## 2020-02-06 RX ORDER — QUETIAPINE FUMARATE 25 MG/1
25 TABLET, FILM COATED ORAL NIGHTLY
Qty: 30 TABLET | Refills: 2 | Status: SHIPPED | OUTPATIENT
Start: 2020-02-06 | End: 2020-04-09
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
## 2020-02-13 NOTE — PROGRESS NOTES
I interviewed this patient and discussed her treatment plan and diagnosis with Dr Jose and agree with both at this time.

## 2020-02-17 ENCOUNTER — PATIENT MESSAGE (OUTPATIENT)
Dept: INTERNAL MEDICINE | Facility: CLINIC | Age: 25
End: 2020-02-17

## 2020-02-18 ENCOUNTER — OFFICE VISIT (OUTPATIENT)
Dept: OTOLARYNGOLOGY | Facility: CLINIC | Age: 25
End: 2020-02-18
Payer: COMMERCIAL

## 2020-02-18 VITALS
TEMPERATURE: 99 F | HEART RATE: 95 BPM | BODY MASS INDEX: 35.08 KG/M2 | SYSTOLIC BLOOD PRESSURE: 120 MMHG | WEIGHT: 259 LBS | DIASTOLIC BLOOD PRESSURE: 87 MMHG | HEIGHT: 72 IN

## 2020-02-18 DIAGNOSIS — J34.2 NASAL SEPTAL DEVIATION: ICD-10-CM

## 2020-02-18 DIAGNOSIS — J32.9 CHRONIC RECURRENT SINUSITIS: Primary | ICD-10-CM

## 2020-02-18 PROCEDURE — 3008F BODY MASS INDEX DOCD: CPT | Mod: CPTII,S$GLB,, | Performed by: OTOLARYNGOLOGY

## 2020-02-18 PROCEDURE — 3008F PR BODY MASS INDEX (BMI) DOCUMENTED: ICD-10-PCS | Mod: CPTII,S$GLB,, | Performed by: OTOLARYNGOLOGY

## 2020-02-18 PROCEDURE — 99204 PR OFFICE/OUTPT VISIT, NEW, LEVL IV, 45-59 MIN: ICD-10-PCS | Mod: S$GLB,,, | Performed by: OTOLARYNGOLOGY

## 2020-02-18 PROCEDURE — 99204 OFFICE O/P NEW MOD 45 MIN: CPT | Mod: S$GLB,,, | Performed by: OTOLARYNGOLOGY

## 2020-02-18 NOTE — PROGRESS NOTES
Ms. Tsang     Vitals:    20 1535   BP: 120/87   Pulse: 95   Temp: 99 °F (37.2 °C)       Chief Complaint:  sinus infection       HPI:  Ms. Tsang is a 24-year-old white female with history of chronic recurrent sinusitis.  She states that she gets approximately 6-7 infections annually.  She has purulent and sometimes bloody discharge during these infections.  She has been treated with antibiotics in the past though these are always recurrent.  She has used Flonase as well as Neti pot regularly.  She also complains of facial pressure pains as well as facial swelling during these infections which seemed to recur every 6-8 weeks.  She states that most of her issues are on her left side.    Review of Systems:  Constitutional:   weight loss or weight gain: Negative  Allergy/Immunologic:   Negative  Nasal Congestion/Obstruction:   Negative  Nosebleeds:   Negative  Sinus infections:   Negative  Headache/Facial Pain:   Negative  Snoring/KATIE:   Negative  Throat: Infections/Pain:   Negative  Hoarseness/Speech Disturbance:   Negative  Trauma Hx:  Negative    Cardiovascular:  M/I Angina: Negative  Hypertension: Negative  Endocrine:    DM/Steroids: Negative  GI:   Dysphagia/Reflux: Negative  :   GYN Pregnancy: Negative  Renal:   Dialysis: Negative  Lymphatic:   Neck Mass/Lymphadenopathy: Negative  Muscoloskeletal:   Negative  Hematologic:   Bleeding Disorders/Anemia: Negative  Neurologic:    Cranial/Neuralgia: Negative  Pulmonary:   Asthma/SOB/Cough: Negative  Skin Disorders: Negative    Past Medical/Surgical/Family/Social History:    ENT Surgery: Negative  Occupational Exposure: Negative   Problems: Negative  Cancer: Negative    Past Family History:   Family history of Cancer: Negative    Past Social History:   Tobacco: Nonsmoker   Alcohol: Social Drinker      Allergies and medications: Reviewed per med card.    Physical Examination:  Ears:   External auditory canals:  Clear   Hearing: Grossly  intact   Tympanic Membranes: Clear  Nose:   External: Normal   Intranasal:  Moderate septal deviation to the left with 2+ turbinates.  Mouth:   Intraorally: Lips, teeth, and gums: Normal   Oropharynx: Normal   Mucosa: Normal   Tongue: Normal  Throat:      Palate: Normal palate with elevation   Tonsils:  Minimal   Posterior Pharynx: Normal  Fiberoptic exam: Not performed  Head/Face:     Inspection: Normal and atraumatic   Palpation/Percussion:  Tender to palpation in the maxillary and left frontal regions.   Facial strength: Normal and symmetric   Salivary glands: Normal  Neck: Supple  Thyroid: No masses  Lymphatics: No nodes  Respiratory:   Effort: Normal  Eyes:   Ocular Mobility: Normal   Vision: Grossly intact  Neuro/Psych:   Cranial Nerves: Grossly Intact   Orientation: Normal   Mood/Affect: Normal      Assessment/Plan:  I have discussed my findings with her in detail as well as my recommendations for treatment.  I have recommended that she continue with her Flonase and I have described how this is used.  I have also suggested sinus rinses utilizing distilled water and I have given her literature on this and described issues.  I will order a [employer] CT scan of her sinuses and she will contact us after this is completed to arrange for follow-up.

## 2020-04-08 ENCOUNTER — PATIENT MESSAGE (OUTPATIENT)
Dept: PSYCHIATRY | Facility: CLINIC | Age: 25
End: 2020-04-08

## 2020-04-09 ENCOUNTER — OFFICE VISIT (OUTPATIENT)
Dept: PSYCHIATRY | Facility: CLINIC | Age: 25
End: 2020-04-09
Payer: COMMERCIAL

## 2020-04-09 DIAGNOSIS — F31.9 BIPOLAR 1 DISORDER: Primary | ICD-10-CM

## 2020-04-09 DIAGNOSIS — F10.21 ALCOHOL USE DISORDER, MODERATE, IN SUSTAINED REMISSION: ICD-10-CM

## 2020-04-09 DIAGNOSIS — F19.11 HISTORY OF DRUG ABUSE IN REMISSION: ICD-10-CM

## 2020-04-09 PROCEDURE — 99213 PR OFFICE/OUTPT VISIT, EST, LEVL III, 20-29 MIN: ICD-10-PCS | Mod: 95,,, | Performed by: STUDENT IN AN ORGANIZED HEALTH CARE EDUCATION/TRAINING PROGRAM

## 2020-04-09 PROCEDURE — 99213 OFFICE O/P EST LOW 20 MIN: CPT | Mod: 95,,, | Performed by: STUDENT IN AN ORGANIZED HEALTH CARE EDUCATION/TRAINING PROGRAM

## 2020-04-09 RX ORDER — SERTRALINE HYDROCHLORIDE 50 MG/1
150 TABLET, FILM COATED ORAL DAILY
Qty: 90 TABLET | Refills: 2 | Status: SHIPPED | OUTPATIENT
Start: 2020-04-09 | End: 2020-07-02 | Stop reason: SDUPTHER

## 2020-04-09 RX ORDER — QUETIAPINE FUMARATE 25 MG/1
25 TABLET, FILM COATED ORAL NIGHTLY
Qty: 30 TABLET | Refills: 0 | Status: SHIPPED | OUTPATIENT
Start: 2020-04-09 | End: 2020-05-18 | Stop reason: SDUPTHER
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
## 2020-04-17 NOTE — PROGRESS NOTES
I reviewed and discussed this patient's diagnosis and treatment plan with Dr Jose and agree with both at this time.

## 2020-05-21 ENCOUNTER — TELEPHONE (OUTPATIENT)
Dept: PSYCHIATRY | Facility: CLINIC | Age: 25
End: 2020-05-21

## 2020-05-21 NOTE — TELEPHONE ENCOUNTER
Called patient to discuss seroquel and Latuda options, no response. Left voicemail encouraging patient to call the office today or tomorrow.      ----- Message from Kaylin Cunha sent at 5/20/2020  2:02 PM CDT -----  Contact: Patient   Pt called and is out of her Seroquel.  She said she feels comfortable trying something else now, and would like to talk to you about whether you'd like her to get a refill on Seroquel or try something else.  Please call her at [phone].

## 2020-06-09 ENCOUNTER — TELEPHONE (OUTPATIENT)
Dept: INTERNAL MEDICINE | Facility: CLINIC | Age: 25
End: 2020-06-09

## 2020-06-09 NOTE — TELEPHONE ENCOUNTER
----- Message from Nate Botello sent at 6/9/2020  3:38 PM CDT -----  Contact: pt   Name of Who is Calling: TERE AVILA [[id number]]    What is the request in detail: Patient is requesting a call back regards to being at a gathering with more then 50 people Patient is asking does she need to be tested for the Covid19 ....  Please contact to further discuss and advise      Can the clinic reply by [facility]: Yes     What Number to Call Back if not in [facility]:  [phone] (home)

## 2020-06-18 ENCOUNTER — NURSE TRIAGE (OUTPATIENT)
Dept: ADMINISTRATIVE | Facility: CLINIC | Age: 25
End: 2020-06-18

## 2020-06-18 NOTE — TELEPHONE ENCOUNTER
Spoke with patient she states that she has two Seroquel 25 mg tablets left. States that she is going out of town in the morning and wants to know if she can get a refill before she leaves tomorrow.  Message will be sent to Dr. Jose.  Advised patient to call back in the morning if she does not hear from office.  Patient verbalized understanding.     Reason for Disposition   Caller requesting a NON-URGENT new prescription or refill and triager unable to refill per unit policy    Protocols used: MEDICATION QUESTION CALL-A-AH

## 2020-06-19 RX ORDER — QUETIAPINE FUMARATE 25 MG/1
25 TABLET, FILM COATED ORAL NIGHTLY
Qty: 30 TABLET | Refills: 5 | Status: SHIPPED | OUTPATIENT
Start: 2020-06-19 | End: 2020-07-02

## 2020-06-19 NOTE — PROGRESS NOTES
Called patient in regards to Seroquel request and left a VM stating it was refilled.      Shae Jose MD  PGY 3 [facility]  6/19/2020 10:14 AM

## 2020-06-22 ENCOUNTER — NURSE TRIAGE (OUTPATIENT)
Dept: ADMINISTRATIVE | Facility: CLINIC | Age: 25
End: 2020-06-22

## 2020-06-22 ENCOUNTER — TELEPHONE (OUTPATIENT)
Dept: INTERNAL MEDICINE | Facility: CLINIC | Age: 25
End: 2020-06-22

## 2020-06-22 DIAGNOSIS — R06.02 SHORTNESS OF BREATH: ICD-10-CM

## 2020-06-22 DIAGNOSIS — Z20.822 EXPOSURE TO COVID-19 VIRUS: Primary | ICD-10-CM

## 2020-06-22 DIAGNOSIS — R06.02 SHORTNESS OF BREATH: Primary | ICD-10-CM

## 2020-06-22 NOTE — TELEPHONE ENCOUNTER
Just started experiencing some SOB. Pt also feels very tired, but states she did a lot of outdoor activities this weekend. Pt was in [place] this past weekend where cases of COVID are high. Pt has hx of asthma. Pt concerned about COVID. Per triage, pt advised to go to ER. Pt refuses dispo.Pt wishes to get an order for COVID test from her MD. RN ensured pt understood the recommended dispo of ER, but noted a message would be sent to her PCP regarding this encounter. She VU. No further questions. Advised pt to call back with any questions or concerns.    Reason for Disposition   MILD difficulty breathing (e.g., minimal/no SOB at rest, SOB with walking, pulse <100)    Additional Information   Negative: SEVERE difficulty breathing (e.g., struggling for each breath, speaks in single words)   Negative: Difficult to awaken or acting confused (e.g., disoriented, slurred speech)   Negative: Bluish (or gray) lips or face now   Negative: Shock suspected (e.g., cold/pale/clammy skin, too weak to stand, low BP, rapid pulse)   Negative: Sounds like a life-threatening emergency to the triager   Negative: SEVERE or constant chest pain or pressure (Exception: mild central chest pain, present only when coughing)   Negative: MODERATE difficulty breathing (e.g., speaks in phrases, SOB even at rest, pulse 100-120)    Protocols used: CORONAVIRUS (COVID-19) DIAGNOSED OR [id number]

## 2020-06-22 NOTE — TELEPHONE ENCOUNTER
Please assist patient with scheduling COVID testing.  Please let her know that based on her symptoms I do recommend that she seek emergency care. I recommend either urgent care or emergency department.    Thanks!

## 2020-06-22 NOTE — TELEPHONE ENCOUNTER
Patient stated she was having shortness of breathe but not anymore. She said it could have been just her nerves after hearing about the rise in the numbers in the covid cases in [place] after visiting [place] for the weekend. She wants to get tested for covid. Lab pending

## 2020-06-22 NOTE — TELEPHONE ENCOUNTER
Please see the nurse triage note from today where I put in the order for the COVID testing.  Please assist her with setting up COVID testing.

## 2020-06-22 NOTE — TELEPHONE ENCOUNTER
----- Message from Gay Dupree sent at 6/22/2020 10:38 AM CDT -----    Name of Who is Calling:TERE AVILA [[id number]]    What is the request in detail: Pt would like speak with the staff regarding shortness of breath Please contact to further discuss and advise    Can the clinic reply by [facility]: no    What [facility] [phone]

## 2020-06-24 ENCOUNTER — CLINICAL SUPPORT (OUTPATIENT)
Dept: URGENT CARE | Facility: CLINIC | Age: 25
End: 2020-06-24
Payer: COMMERCIAL

## 2020-06-24 VITALS — OXYGEN SATURATION: 96 % | TEMPERATURE: 98 F | HEART RATE: 103 BPM

## 2020-06-24 DIAGNOSIS — R06.02 SHORTNESS OF BREATH: ICD-10-CM

## 2020-06-24 PROCEDURE — U0003 INFECTIOUS AGENT DETECTION BY NUCLEIC ACID (DNA OR RNA); SEVERE ACUTE RESPIRATORY SYNDROME CORONAVIRUS 2 (SARS-COV-2) (CORONAVIRUS DISEASE [COVID-19]), AMPLIFIED PROBE TECHNIQUE, MAKING USE OF HIGH THROUGHPUT TECHNOLOGIES AS DESCRIBED BY CMS-2020-01-R: HCPCS

## 2020-06-28 LAB — SARS-COV-2 RNA RESP QL NAA+PROBE: NOT DETECTED

## 2020-07-02 ENCOUNTER — OFFICE VISIT (OUTPATIENT)
Dept: PSYCHIATRY | Facility: CLINIC | Age: 25
End: 2020-07-02
Payer: COMMERCIAL

## 2020-07-02 VITALS
BODY MASS INDEX: 34.45 KG/M2 | HEART RATE: 99 BPM | WEIGHT: 261.13 LBS | DIASTOLIC BLOOD PRESSURE: 81 MMHG | SYSTOLIC BLOOD PRESSURE: 121 MMHG

## 2020-07-02 DIAGNOSIS — F19.11 HISTORY OF DRUG ABUSE IN REMISSION: ICD-10-CM

## 2020-07-02 DIAGNOSIS — F31.9 BIPOLAR 1 DISORDER: Primary | ICD-10-CM

## 2020-07-02 DIAGNOSIS — F41.0 GENERALIZED ANXIETY DISORDER WITH PANIC ATTACKS: ICD-10-CM

## 2020-07-02 DIAGNOSIS — F10.21 ALCOHOL USE DISORDER, MODERATE, IN SUSTAINED REMISSION: ICD-10-CM

## 2020-07-02 DIAGNOSIS — F41.1 GENERALIZED ANXIETY DISORDER WITH PANIC ATTACKS: ICD-10-CM

## 2020-07-02 PROCEDURE — 3008F PR BODY MASS INDEX (BMI) DOCUMENTED: ICD-10-PCS | Mod: CPTII,S$GLB,,

## 2020-07-02 PROCEDURE — 3008F BODY MASS INDEX DOCD: CPT | Mod: CPTII,S$GLB,,

## 2020-07-02 PROCEDURE — 99213 PR OFFICE/OUTPT VISIT, EST, LEVL III, 20-29 MIN: ICD-10-PCS | Mod: S$GLB,,,

## 2020-07-02 PROCEDURE — 99213 OFFICE O/P EST LOW 20 MIN: CPT | Mod: S$GLB,,,

## 2020-07-02 PROCEDURE — 99999 PR PBB SHADOW E&M-EST. PATIENT-LVL II: ICD-10-PCS | Mod: PBBFAC,,,

## 2020-07-02 PROCEDURE — 99999 PR PBB SHADOW E&M-EST. PATIENT-LVL II: CPT | Mod: PBBFAC,,,

## 2020-07-02 RX ORDER — LAMOTRIGINE 25 MG/1
25 TABLET ORAL NIGHTLY
Qty: 42 TABLET | Refills: 0 | Status: SHIPPED | OUTPATIENT
Start: 2020-07-02 | End: 2020-07-16

## 2020-07-02 RX ORDER — SERTRALINE HYDROCHLORIDE 50 MG/1
150 TABLET, FILM COATED ORAL DAILY
Qty: 90 TABLET | Refills: 2 | Status: SHIPPED | OUTPATIENT
Start: 2020-07-02 | End: 2020-07-31 | Stop reason: SDUPTHER

## 2020-07-02 RX ORDER — LAMOTRIGINE 100 MG/1
100 TABLET ORAL DAILY
Qty: 30 TABLET | Refills: 0 | Status: SHIPPED | OUTPATIENT
Start: 2020-07-31 | End: 2020-10-30

## 2020-07-02 NOTE — PROGRESS NOTES
07/02/2020: I reviewed and discussed this patient with Dr Gonzalez and agree with the treatment plan and diagnosis at this time.
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
## 2020-07-02 NOTE — PATIENT INSTRUCTIONS
1. Continue zoloft 150 mg daily by mouth.  2. Stop taking seroquel.  3. Will taper lamotrigine. For the first 14 days of taper (weeks 1-2), take 1 25 mg tablet nightly by mouth. For the following 14 days( weeks 3-4), take 2 25 mg tablets daily by mouth (50 mg total). At the end of week 4, begin to take 100 mg daily by mouth. Call clinic with any questions about these instructions.   4. OK to try 0.5-1 mg melatonin qhs for sleep.  5. Return to clinic in 1 month to follow up.   6. If you experience any active suicidal ideation, homicidal ideation, or psychosis, present to the emergency department.

## 2020-07-13 ENCOUNTER — PATIENT MESSAGE (OUTPATIENT)
Dept: PSYCHIATRY | Facility: CLINIC | Age: 25
End: 2020-07-13

## 2020-07-16 NOTE — TELEPHONE ENCOUNTER
"Spoke with patient last night via telephone. She reports that the lamictal "is not working for her" (currently on 50 mg) and she feels she is having a mixed/manic episode. Pt demonstrated rapid speech, inappropriate laughter, and reports impulsive behavior including cutting off all her hair and burning her ex's things. Reports poor sleep, but still sleeping >6 hours a night. Able to go to work and manage all her tasks there. Advised patient to stop lamictal as she feels it has worsened her anxiety and will not assist with hypomanic episode. Pt has discussed Latuda in the past but does not want to take as she does not feel she can consistently take with food.Discussed Abilify, including risks of NMS/movement d/o/ need to follow HBA1c and lipid panel. Pt interested in trying this medication. Advised her to keep upcoming appointment. Pt denies any SI, plan to commit suicide, HI, AVH.     - stop lamictal  - start Abilify 5 mg daily  - keep appointment in 2 weeks (may move up if need be)  - will follow up with therapist once release is signed     Madeleine Gonzalez MD    "

## 2020-07-31 ENCOUNTER — OFFICE VISIT (OUTPATIENT)
Dept: DERMATOLOGY | Facility: CLINIC | Age: 25
End: 2020-07-31
Payer: COMMERCIAL

## 2020-07-31 ENCOUNTER — OFFICE VISIT (OUTPATIENT)
Dept: PSYCHIATRY | Facility: CLINIC | Age: 25
End: 2020-07-31
Payer: COMMERCIAL

## 2020-07-31 VITALS — BODY MASS INDEX: 34.43 KG/M2 | WEIGHT: 261 LBS

## 2020-07-31 DIAGNOSIS — L81.4 LENTIGINES: ICD-10-CM

## 2020-07-31 DIAGNOSIS — D22.9 NEVUS OF MULTIPLE SITES: ICD-10-CM

## 2020-07-31 DIAGNOSIS — L60.3 BRITTLE NAILS: ICD-10-CM

## 2020-07-31 DIAGNOSIS — D22.9 SKIN MOLE: ICD-10-CM

## 2020-07-31 DIAGNOSIS — F31.9 BIPOLAR 1 DISORDER: Primary | ICD-10-CM

## 2020-07-31 DIAGNOSIS — D48.5 NEOPLASM OF UNCERTAIN BEHAVIOR OF SKIN: Primary | ICD-10-CM

## 2020-07-31 PROCEDURE — 88305 TISSUE EXAM BY PATHOLOGIST: CPT | Performed by: DERMATOLOGY

## 2020-07-31 PROCEDURE — 99202 OFFICE O/P NEW SF 15 MIN: CPT | Mod: 25,S$GLB,, | Performed by: DERMATOLOGY

## 2020-07-31 PROCEDURE — 3008F BODY MASS INDEX DOCD: CPT | Mod: CPTII,S$GLB,, | Performed by: DERMATOLOGY

## 2020-07-31 PROCEDURE — 88305 TISSUE EXAM BY PATHOLOGIST: CPT | Mod: 26,,, | Performed by: DERMATOLOGY

## 2020-07-31 PROCEDURE — 3008F PR BODY MASS INDEX (BMI) DOCUMENTED: ICD-10-PCS | Mod: CPTII,S$GLB,, | Performed by: DERMATOLOGY

## 2020-07-31 PROCEDURE — 99999 PR PBB SHADOW E&M-EST. PATIENT-LVL III: CPT | Mod: PBBFAC,,, | Performed by: DERMATOLOGY

## 2020-07-31 PROCEDURE — 88305 TISSUE EXAM BY PATHOLOGIST: ICD-10-PCS | Mod: 26,,, | Performed by: DERMATOLOGY

## 2020-07-31 PROCEDURE — 99213 PR OFFICE/OUTPT VISIT, EST, LEVL III, 20-29 MIN: ICD-10-PCS | Mod: 95,,,

## 2020-07-31 PROCEDURE — 99213 OFFICE O/P EST LOW 20 MIN: CPT | Mod: 95,,,

## 2020-07-31 PROCEDURE — 11300 PR SHAV SKIN LES < 0.5 CM TRUNK,ARM,LEG: ICD-10-PCS | Mod: S$GLB,,, | Performed by: DERMATOLOGY

## 2020-07-31 PROCEDURE — 99999 PR PBB SHADOW E&M-EST. PATIENT-LVL III: ICD-10-PCS | Mod: PBBFAC,,, | Performed by: DERMATOLOGY

## 2020-07-31 PROCEDURE — 11300 SHAVE SKIN LESION 0.5 CM/<: CPT | Mod: S$GLB,,, | Performed by: DERMATOLOGY

## 2020-07-31 PROCEDURE — 99202 PR OFFICE/OUTPT VISIT, NEW, LEVL II, 15-29 MIN: ICD-10-PCS | Mod: 25,S$GLB,, | Performed by: DERMATOLOGY

## 2020-07-31 RX ORDER — SERTRALINE HYDROCHLORIDE 50 MG/1
150 TABLET, FILM COATED ORAL DAILY
Qty: 90 TABLET | Refills: 2 | Status: SHIPPED | OUTPATIENT
Start: 2020-07-31 | End: 2020-09-04 | Stop reason: SDUPTHER

## 2020-07-31 RX ORDER — ARIPIPRAZOLE 5 MG/1
5 TABLET ORAL DAILY
Qty: 30 TABLET | Refills: 11 | Status: SHIPPED | OUTPATIENT
Start: 2020-07-31 | End: 2020-09-04 | Stop reason: SDUPTHER

## 2020-07-31 RX ORDER — MIRTAZAPINE 15 MG/1
15 TABLET, FILM COATED ORAL NIGHTLY
Qty: 30 TABLET | Refills: 0 | Status: SHIPPED | OUTPATIENT
Start: 2020-07-31 | End: 2020-08-24 | Stop reason: SDUPTHER
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
## 2020-07-31 NOTE — PROGRESS NOTES
Subjective:       Patient ID:  Candace Tsang is a 25 y.o. female who presents for   Chief Complaint   Patient presents with    Skin Check     UBSE    Mole     left back, chin     Irritated nevus on left lower back present for over a year, also brittle nails tried biotin no better.     Mole - Initial  Affected locations: face, left arm, right arm, chest, torso, back, abdomen and chin  Signs / symptoms: itching and pain  Aggravated by: nothing  Relieving factors/Treatments tried: nothing        Review of Systems   Constitutional: Negative for fever, chills, weight loss, weight gain, fatigue, night sweats and malaise.   Skin: Positive for daily sunscreen use. Negative for activity-related sunscreen use and wears hat.   Hematologic/Lymphatic: Does not bruise/bleed easily.        Objective:    Physical Exam   Constitutional: She appears well-developed and well-nourished. No distress.   Neurological: She is alert and oriented to person, place, and time. She is not disoriented.   Psychiatric: She has a normal mood and affect.   Skin:   Areas Examined (abnormalities noted in diagram):   Head / Face Inspection Performed  Neck Inspection Performed  Chest / Axilla Inspection Performed  Back Inspection Performed  RUE Inspected  LUE Inspection Performed                   Diagram Legend     Erythematous scaling macule/papule c/w actinic keratosis       Vascular papule c/w angioma      Pigmented verrucoid papule/plaque c/w seborrheic keratosis      Yellow umbilicated papule c/w sebaceous hyperplasia      Irregularly shaped tan macule c/w lentigo     1-2 mm smooth white papules consistent with Milia      Movable subcutaneous cyst with punctum c/w epidermal inclusion cyst      Subcutaneous movable cyst c/w pilar cyst      Firm pink to brown papule c/w dermatofibroma      Pedunculated fleshy papule(s) c/w skin tag(s)      Evenly pigmented macule c/w junctional nevus     Mildly variegated pigmented, slightly  "irregular-bordered macule c/w mildly atypical nevus      Flesh colored to evenly pigmented papule c/w intradermal nevus       Pink pearly papule/plaque c/w basal cell carcinoma      Erythematous hyperkeratotic cursted plaque c/w SCC      Surgical scar with no sign of skin cancer recurrence      Open and closed comedones      Inflammatory papules and pustules      Verrucoid papule consistent consistent with wart     Erythematous eczematous patches and plaques     Dystrophic onycholytic nail with subungual debris c/w onychomycosis     Umbilicated papule    Erythematous-base heme-crusted tan verrucoid plaque consistent with inflamed seborrheic keratosis     Erythematous Silvery Scaling Plaque c/w Psoriasis     See annotation      Assessment / Plan:      Pathology Orders:     Normal Orders This Visit    Specimen to Pathology, Dermatology     Questions:    Procedure Type: Dermatology and skin neoplasms    Number of Specimens: 1    ------------------------: -------------------------    Spec 1 Procedure: Biopsy    Spec 1 Clinical Impression: nevus    Spec 1 Source: left lower back        Neoplasm of uncertain behavior of skin  -     Specimen to Pathology, Dermatology  Shave removal procedure note:    Shave removal performed after verbal consent including risk of infection, scar, recurrence, need for additional treatment of site. Area prepped with alcohol, anesthetized 1% lidocaine with epinephrine. Lesional tissue shaved. Lesion defect size 5mm No complications. Dressing applied. Wound care explained.            Skin mole  -     Ambulatory referral/consult to Dermatology    Nevus of multiple sites  The "ABCD" rules to observe pigmented lesions were reviewed.  Brochure provided      Lentigines  The "ABCD" rules to observe pigmented lesions were reviewed.  sunscreen  Brittle nails  Lulu reyna hard as nails           Follow up in about 1 year (around 7/31/2021).  "

## 2020-07-31 NOTE — LETTER
July 31, 2020      Veronica Bowman MD  [address]  [address]  [facility] [place] [facility] [address]           [facility]  [address]  [place] [address]  Phone: [phone]  Fax: [phone]          Patient: Candace Tsang   MR Number: [id number]   YOB: 1995   Date of Visit: 7/31/2020       Dear Dr. Veronica Bowman:    Thank you for referring Candace Tsang to me for evaluation. Attached you will find relevant portions of my assessment and plan of care.    If you have questions, please do not hesitate to call me. I look forward to following Candace Tsang along with you.    Sincerely,    Regina Majano MD    Enclosure  CC:  No Recipients    If you would like to receive this communication electronically, please contact [email or web address] or [phone] to request more information on [email or web address] Link access.    For providers and/or their staff who would like to refer a patient to [facility], please contact us through our one-stop-shop provider referral line, [facility], at [phone].    If you feel you have received this communication in error or would no longer like to receive these types of communications, please e-mail [email or web address]

## 2020-08-02 NOTE — PATIENT INSTRUCTIONS
DIAGNOSES:  Bipolar 1 disorder  Hx of polysubstance aubse     PLAN:  Psych Med:   Continue Abilify 5 mg daily.    Continue zoloft 150 mg daily.   Stop lamotrigine.   Initiate mirtazipine 15 mg qhs.    Please obtain labwork- tsh, hba1c, lipid panel, etc.   Continue psychotherapy.    RTC 1 month.   Present to ED/call 911 for worsening sx of valery, AVH, SI, HI, any other emergency.

## 2020-08-06 LAB
FINAL PATHOLOGIC DIAGNOSIS: NORMAL
GROSS: NORMAL
MICROSCOPIC EXAM: NORMAL

## 2020-08-14 NOTE — PROGRESS NOTES
08/14/2020; I reveiwed and discussed this patient's diagnosis and treatment plan with Dr Gonzalez and agree with both at this time. ADELINA Bardales MD

## 2020-08-24 RX ORDER — MIRTAZAPINE 15 MG/1
15 TABLET, FILM COATED ORAL NIGHTLY
Qty: 30 TABLET | Refills: 2 | Status: SHIPPED | OUTPATIENT
Start: 2020-08-24 | End: 2020-11-13

## 2020-08-26 ENCOUNTER — HOSPITAL ENCOUNTER (EMERGENCY)
Facility: OTHER | Age: 25
Discharge: HOME | End: 2020-08-26
Attending: EMERGENCY MEDICINE
Payer: COMMERCIAL

## 2020-08-26 ENCOUNTER — TELEPHONE (OUTPATIENT)
Dept: OBSTETRICS AND GYNECOLOGY | Facility: CLINIC | Age: 25
End: 2020-08-26

## 2020-08-26 ENCOUNTER — NURSE TRIAGE (OUTPATIENT)
Dept: ADMINISTRATIVE | Facility: CLINIC | Age: 25
End: 2020-08-26

## 2020-08-26 VITALS
WEIGHT: 250 LBS | SYSTOLIC BLOOD PRESSURE: 110 MMHG | TEMPERATURE: 99 F | DIASTOLIC BLOOD PRESSURE: 71 MMHG | RESPIRATION RATE: 16 BRPM | HEIGHT: 72 IN | HEART RATE: 88 BPM | BODY MASS INDEX: 33.86 KG/M2 | OXYGEN SATURATION: 100 %

## 2020-08-26 DIAGNOSIS — R10.31 RIGHT LOWER QUADRANT ABDOMINAL PAIN: ICD-10-CM

## 2020-08-26 DIAGNOSIS — N83.201 RIGHT OVARIAN CYST: Primary | ICD-10-CM

## 2020-08-26 DIAGNOSIS — R16.2 HEPATOSPLENOMEGALY: ICD-10-CM

## 2020-08-26 LAB
ALBUMIN SERPL BCP-MCNC: 3.8 G/DL (ref 3.5–5.2)
ALP SERPL-CCNC: 50 U/L (ref 55–135)
ALT SERPL W/O P-5'-P-CCNC: 21 U/L (ref 10–44)
ANION GAP SERPL CALC-SCNC: 10 MMOL/L (ref 8–16)
AST SERPL-CCNC: 21 U/L (ref 10–40)
B-HCG UR QL: NEGATIVE
BASOPHILS # BLD AUTO: 0.05 K/UL (ref 0–0.2)
BASOPHILS NFR BLD: 0.5 % (ref 0–1.9)
BILIRUB SERPL-MCNC: 0.3 MG/DL (ref 0.1–1)
BUN SERPL-MCNC: 16 MG/DL (ref 6–20)
CALCIUM SERPL-MCNC: 9 MG/DL (ref 8.7–10.5)
CHLORIDE SERPL-SCNC: 107 MMOL/L (ref 95–110)
CO2 SERPL-SCNC: 22 MMOL/L (ref 23–29)
CREAT SERPL-MCNC: 0.8 MG/DL (ref 0.5–1.4)
CTP QC/QA: YES
DIFFERENTIAL METHOD: ABNORMAL
EOSINOPHIL # BLD AUTO: 0.3 K/UL (ref 0–0.5)
EOSINOPHIL NFR BLD: 2.8 % (ref 0–8)
ERYTHROCYTE [DISTWIDTH] IN BLOOD BY AUTOMATED COUNT: 12.6 % (ref 11.5–14.5)
EST. GFR  (AFRICAN AMERICAN): >60 ML/MIN/1.73 M^2
EST. GFR  (NON AFRICAN AMERICAN): >60 ML/MIN/1.73 M^2
GLUCOSE SERPL-MCNC: 86 MG/DL (ref 70–110)
HCT VFR BLD AUTO: 37.5 % (ref 37–48.5)
HGB BLD-MCNC: 12.3 G/DL (ref 12–16)
IMM GRANULOCYTES # BLD AUTO: 0.02 K/UL (ref 0–0.04)
IMM GRANULOCYTES NFR BLD AUTO: 0.2 % (ref 0–0.5)
LIPASE SERPL-CCNC: 42 U/L (ref 4–60)
LYMPHOCYTES # BLD AUTO: 1.9 K/UL (ref 1–4.8)
LYMPHOCYTES NFR BLD: 17.8 % (ref 18–48)
MCH RBC QN AUTO: 28.5 PG (ref 27–31)
MCHC RBC AUTO-ENTMCNC: 32.8 G/DL (ref 32–36)
MCV RBC AUTO: 87 FL (ref 82–98)
MONOCYTES # BLD AUTO: 0.5 K/UL (ref 0.3–1)
MONOCYTES NFR BLD: 4.7 % (ref 4–15)
NEUTROPHILS # BLD AUTO: 8.1 K/UL (ref 1.8–7.7)
NEUTROPHILS NFR BLD: 74 % (ref 38–73)
NRBC BLD-RTO: 0 /100 WBC
PLATELET # BLD AUTO: 273 K/UL (ref 150–350)
PMV BLD AUTO: 9.2 FL (ref 9.2–12.9)
POTASSIUM SERPL-SCNC: 4.1 MMOL/L (ref 3.5–5.1)
PROT SERPL-MCNC: 6.8 G/DL (ref 6–8.4)
RBC # BLD AUTO: 4.32 M/UL (ref 4–5.4)
SODIUM SERPL-SCNC: 139 MMOL/L (ref 136–145)
WBC # BLD AUTO: 10.86 K/UL (ref 3.9–12.7)

## 2020-08-26 PROCEDURE — 85025 COMPLETE CBC W/AUTO DIFF WBC: CPT

## 2020-08-26 PROCEDURE — 63600175 PHARM REV CODE 636 W HCPCS: Performed by: PHYSICIAN ASSISTANT

## 2020-08-26 PROCEDURE — 81025 URINE PREGNANCY TEST: CPT | Performed by: PHYSICIAN ASSISTANT

## 2020-08-26 PROCEDURE — 80053 COMPREHEN METABOLIC PANEL: CPT

## 2020-08-26 PROCEDURE — 83690 ASSAY OF LIPASE: CPT

## 2020-08-26 PROCEDURE — 25500020 PHARM REV CODE 255: Performed by: EMERGENCY MEDICINE

## 2020-08-26 PROCEDURE — 99285 EMERGENCY DEPT VISIT HI MDM: CPT | Mod: 25

## 2020-08-26 PROCEDURE — 96375 TX/PRO/DX INJ NEW DRUG ADDON: CPT

## 2020-08-26 PROCEDURE — 96374 THER/PROPH/DIAG INJ IV PUSH: CPT | Mod: 59

## 2020-08-26 RX ORDER — KETOROLAC TROMETHAMINE 30 MG/ML
15 INJECTION, SOLUTION INTRAMUSCULAR; INTRAVENOUS
Status: COMPLETED | OUTPATIENT
Start: 2020-08-26 | End: 2020-08-26

## 2020-08-26 RX ORDER — ONDANSETRON 2 MG/ML
4 INJECTION INTRAMUSCULAR; INTRAVENOUS
Status: COMPLETED | OUTPATIENT
Start: 2020-08-26 | End: 2020-08-26

## 2020-08-26 RX ORDER — NAPROXEN 500 MG/1
500 TABLET ORAL 2 TIMES DAILY WITH MEALS
Qty: 14 TABLET | Refills: 0 | Status: SHIPPED | OUTPATIENT
Start: 2020-08-26 | End: 2020-09-02

## 2020-08-26 RX ADMIN — KETOROLAC TROMETHAMINE 15 MG: 30 INJECTION, SOLUTION INTRAMUSCULAR at 04:08

## 2020-08-26 RX ADMIN — IOHEXOL 100 ML: 350 INJECTION, SOLUTION INTRAVENOUS at 05:08

## 2020-08-26 RX ADMIN — ONDANSETRON 4 MG: 2 INJECTION INTRAMUSCULAR; INTRAVENOUS at 04:08

## 2020-08-26 NOTE — ED NOTES
Patient sitting up in bed, appears comfortable.  States pain is 2/10.  Patient updated on POC, waiting for US.

## 2020-08-26 NOTE — ED NOTES
Patient void.  Not enough for urinalysis. Will try later to collect.     Julia Perdue, Patient Care Assistant  08/26/20 4823

## 2020-08-26 NOTE — Clinical Note
Candace Tsang was seen and treated in our emergency department on 8/26/2020.  She may return to work on 08/28/2020.       If you have any questions or concerns, please don't hesitate to call.      Nino Guidry PA-C

## 2020-08-26 NOTE — ED TRIAGE NOTES
Pt presents to ED for evalaution of RLQ abdominal pain that worsened today. She reports some associated nausea. RLQ is tender to touch. She is AAOx 3, answers questions appropriately

## 2020-08-26 NOTE — TELEPHONE ENCOUNTER
Patient c/o abdominal pain all over abdomen, 8/10, started yesterday and getting worse. Care advice discussed, understanding verbalized. She is on the way to the ED now. Please contact caller directly to discuss any further care advice.    Reason for Disposition   SEVERE abdominal pain (e.g., excruciating)    Additional Information   Negative: Passed out (i.e., fainted, collapsed and was not responding)   Negative: Shock suspected (e.g., cold/pale/clammy skin, too weak to stand, low BP, rapid pulse)   Negative: Sounds like a life-threatening emergency to the triager   Negative: Chest pain   Negative: Pain is mainly in upper abdomen (if needed ask: 'is it mainly above the belly button?')   Negative: Abdominal pain and pregnant > 20 weeks   Negative: Abdominal pain and pregnant < 20 weeks    Protocols used: ABDOMINAL PAIN - FEMALE-A-OH

## 2020-08-26 NOTE — ED PROVIDER NOTES
"Encounter Date: 2020       History     Chief Complaint   Patient presents with    Abdominal Pain     "I had to have emergency surgery because I had  ton of cysts and I think it's the same thing but I don't know because it feels different."  Patient diaphoretic.     Patient is a 25-year-old female who presents to the emergency department with abdominal pain.  Patient reports onset of right lower quadrant pain while at work today.  She states the pain is now radiating to her entire abdomen.  She states the pain is constant.  She states she has history of right ovarian cyst that have required surgical removal but this feels different.  She also reports nausea but denies vomiting.  She denies fever.  She is also reporting urinary frequency for the past 2-3 days.  She denies dysuria or flank pain.    The history is provided by the patient.     Review of patient's allergies indicates:  No Known Allergies  Past Medical History:   Diagnosis Date    Alcohol abuse     in remission x 2 years    Anxiety     Bipolar 1 disorder     Drug abuse in remission      Past Surgical History:   Procedure Laterality Date    OVARY SURGERY  2018    Diagnostic laparoscopy and drainage of right ovarian cyst     TEMPOROMANDIBULAR JOINT SURGERY      arthrocentesis      Family History   Problem Relation Age of Onset    Bipolar disorder Mother     Alcohol abuse Mother     Depression Mother     Multiple sclerosis Father     Bipolar disorder Father     Diabetes Maternal Grandfather     Alcohol abuse Paternal Grandmother     Bipolar disorder Brother     Breast cancer Neg Hx     Colon cancer Neg Hx     Ovarian cancer Neg Hx      Social History     Tobacco Use    Smoking status: Current Every Day Smoker     Years: 9.00     Types: Cigarettes, Vaping with nicotine     Last attempt to quit: 2018     Years since quittin.7    Smokeless tobacco: Never Used    Tobacco comment: currently vapes   Substance Use Topics    " Alcohol use: Not Currently    Drug use: Not Currently     Comment: PmH of drug abuse     Review of Systems   Constitutional: Negative for chills and fever.   Respiratory: Negative for shortness of breath.    Cardiovascular: Negative for chest pain.   Gastrointestinal: Positive for abdominal pain and nausea. Negative for diarrhea and vomiting.   Genitourinary: Negative for dysuria.   Musculoskeletal: Negative for arthralgias.   Skin: Negative for rash.   Allergic/Immunologic: Negative for immunocompromised state.   Neurological: Negative for headaches.       Physical Exam     Initial Vitals [08/26/20 1534]   BP Pulse Resp Temp SpO2   (!) 175/83 98 (!) 22 98.6 °F (37 °C) 98 %      MAP       --         Physical Exam    Nursing note and vitals reviewed.  Constitutional: Vital signs are normal. She is not diaphoretic. She is Obese . She is cooperative.  Non-toxic appearance. She does not have a sickly appearance. No distress.   HENT:   Head: Normocephalic and atraumatic.   Eyes: Conjunctivae and EOM are normal.   Neck: Normal range of motion. Neck supple.   Cardiovascular: Normal rate, regular rhythm and intact distal pulses.   Pulmonary/Chest: Breath sounds normal. No respiratory distress. She has no wheezes.   Abdominal: Soft. Bowel sounds are normal. There is abdominal tenderness (Epigastric and periumbilical). There is rebound. There is no CVA tenderness.   Musculoskeletal: Normal range of motion.   Neurological: She is alert and oriented to person, place, and time. GCS eye subscore is 4. GCS verbal subscore is 5. GCS motor subscore is 6.   Skin: Skin is warm and dry. No rash noted.         ED Course   Procedures  Labs Reviewed   CBC W/ AUTO DIFFERENTIAL - Abnormal; Notable for the following components:       Result Value    Gran # (ANC) 8.1 (*)     Gran% 74.0 (*)     Lymph% 17.8 (*)     All other components within normal limits   COMPREHENSIVE METABOLIC PANEL - Abnormal; Notable for the following components:    CO2  22 (*)     Alkaline Phosphatase 50 (*)     All other components within normal limits   LIPASE   URINALYSIS, REFLEX TO URINE CULTURE   POCT URINE PREGNANCY          Imaging Results          US Pelvis Comp with Transvag NON-OB (xpd) (Final result)  Result time 08/26/20 19:26:11   Procedure changed from US Pelvis Complete Non OB     Final result by Kamryn Burks MD (08/26/20 19:26:11)                 Impression:      Free fluid seen within the pelvis with mild complex 2.2 cm right ovarian cystic lesion, possible ruptured cyst.  This corresponds with findings seen on earlier CT.  Ob GYN follow-up recommended and consider repeat pelvic ultrasound in 6 weeks to ensure resolution.      Electronically signed by: Kamryn Burks MD  Date:    08/26/2020  Time:    19:26             Narrative:    EXAMINATION:  US PELVIS COMP WITH TRANSVAG NON-OB (XPD)    CLINICAL HISTORY:  right adnexal pain;    TECHNIQUE:  Transabdominal sonography of the pelvis was performed, followed by transvaginal sonography to better evaluate the uterus and ovaries.    COMPARISON:  CT abdomen and pelvis from the same date.    FINDINGS:  The uterus is retroverted and measures 8.2 x 4.0 x 5.9.  Uterine parenchyma is homogeneous without evidence for masses. The endometrial echo complex is normal in thickness and measures 4 mm.    The right ovary measures 5.1 x 3.8 x 2.1 cm. The left ovary measures 3.0 x 1.7 x 2.2 cm. Arterial and venous flow are preserved bilaterally.  Follicles are seen in both ovaries.  Mild complex right ovarian cystic lesion is seen measuring 2.2 x 2.0 x 1.2 cm.  Free fluid is seen within the pelvis.                               CT Abdomen Pelvis With Contrast (Final result)  Result time 08/26/20 17:55:06    Final result by Kamryn Burks MD (08/26/20 17:55:06)                 Impression:      1. Small volume pelvic free fluid which appears greater than normal expected physiologic amount of fluid.  Minimal stranding/inflammatory  change seen in the right adnexal region.  Findings could potentially relate to small ruptured right ovarian cyst or corpus luteum cyst.  No rim enhancing collection seen.  Consider further evaluation with pelvic ultrasound.  2. No evidence of appendicitis.  3. Mild hepatosplenomegaly.      Electronically signed by: Kamryn Burks MD  Date:    08/26/2020  Time:    17:55             Narrative:    EXAMINATION:  CT ABDOMEN PELVIS WITH CONTRAST    CLINICAL HISTORY:  RLQ abdominal pain, appendicitis suspected (Age => 12y);    TECHNIQUE:  Low dose axial images, sagittal and coronal reformations were obtained from the lung bases to the pubic symphysis following the IV administration of 100 mL of Omnipaque 350 .  Oral contrast was not given.    COMPARISON:  None.    FINDINGS:  The visualized portion of the heart is unremarkable.  The lung bases are clear.    No significant hepatic abnormalities are identified.  Liver is mildly enlarged measuring 19 cm.  There is no intra-or extrahepatic biliary ductal dilatation.  Gallbladder is contracted.  Spleen is enlarged measuring 14 cm.    Kidneys enhance normally with no evidence of hydronephrosis.  Distal ureteral courses are difficult to track.  Urinary bladder is unremarkable.  Uterus is retroverted.  Free fluid is seen within the pelvis which appears slightly greater than expected physiologic amount.  Minimal stranding/inflammatory change is seen within the right adnexal region.  Otherwise no definite adnexal abnormalities are seen.    Suspected small appendix is visualized with no abnormalities seen.  This is removed from aforementioned right adnexal region of abnormality.  The visualized loops of small and large bowel show no evidence of obstruction or inflammation.  No free air seen.    Aorta tapers normally.    No acute osseous abnormality identified. Subcutaneous soft tissue show no significant abnormalities.                                 Medical Decision Making:   Initial  Assessment:   Urgent evaluation of a 25 y.o. female presenting to the emergency department complaining of right lower quadrant abdominal pain and nausea onset today. Patient is afebrile, nontoxic appearing and hemodynamically stable.  -patient with periumbilical pain, guarding and rebound tenderness.  -concern for appendicitis.  Pain is not located in the adnexal region, of left suspicion for ovarian cyst/torsion.  -will obtain basic labs, provide patient IV fluids and obtain CT abdomen pelvis.  ED Management:  -blood work without gross abnormalities.  -stay abdomen pelvis reveals no signs of appendicitis.  There is a small volume of pelvic free fluid and inflammatory changes in the right adnexal region.  Ultrasound recommended.  Mild hepatosplenomegaly noted.  Liver enzymes are normal.  Patient has history of alcohol abuse in the past, in remission for 2 years.  -ultrasound was obtained which revealed free fluid within the pelvis and a mildly complex 2.2 cm right ovarian cystic lesion, possibly ruptured cyst.  I did discuss case with OB Gyne, who recommends close follow-up with Dr. Barclay.  Patient's pain was well controlled.  Will send home with naproxen.  Patient had no other complaints today and was stable at discharge.                   ED Course as of Aug 26 1953   Wed Aug 26, 2020   1947 7:40 PM- OBGYN paged.       [AG]      ED Course User Index  [AG] Nino Guidry PA-C                Clinical Impression:     1. Right ovarian cyst    2. Right lower quadrant abdominal pain    3. Hepatosplenomegaly                               Nino Guidry PA-C  08/26/20 1958

## 2020-08-27 ENCOUNTER — OFFICE VISIT (OUTPATIENT)
Dept: OBSTETRICS AND GYNECOLOGY | Facility: CLINIC | Age: 25
End: 2020-08-27
Payer: COMMERCIAL

## 2020-08-27 ENCOUNTER — TELEPHONE (OUTPATIENT)
Dept: OBSTETRICS AND GYNECOLOGY | Facility: CLINIC | Age: 25
End: 2020-08-27

## 2020-08-27 ENCOUNTER — LAB VISIT (OUTPATIENT)
Dept: LAB | Facility: OTHER | Age: 25
End: 2020-08-27
Attending: FAMILY MEDICINE
Payer: COMMERCIAL

## 2020-08-27 VITALS
HEIGHT: 72 IN | WEIGHT: 261.94 LBS | DIASTOLIC BLOOD PRESSURE: 72 MMHG | BODY MASS INDEX: 35.48 KG/M2 | SYSTOLIC BLOOD PRESSURE: 110 MMHG

## 2020-08-27 DIAGNOSIS — Z00.00 ANNUAL PHYSICAL EXAM: ICD-10-CM

## 2020-08-27 DIAGNOSIS — Z20.2 EXPOSURE TO STD: ICD-10-CM

## 2020-08-27 DIAGNOSIS — N83.209 RUPTURED OVARIAN CYST: Primary | ICD-10-CM

## 2020-08-27 DIAGNOSIS — Z13.1 SCREENING FOR DIABETES MELLITUS: ICD-10-CM

## 2020-08-27 DIAGNOSIS — Z11.3 SCREEN FOR SEXUALLY TRANSMITTED DISEASES: ICD-10-CM

## 2020-08-27 DIAGNOSIS — R10.2 PELVIC PAIN: ICD-10-CM

## 2020-08-27 DIAGNOSIS — Z13.220 SCREENING FOR LIPID DISORDERS: ICD-10-CM

## 2020-08-27 LAB
ALBUMIN SERPL BCP-MCNC: 3.8 G/DL (ref 3.5–5.2)
ALP SERPL-CCNC: 50 U/L (ref 55–135)
ALT SERPL W/O P-5'-P-CCNC: 20 U/L (ref 10–44)
ANION GAP SERPL CALC-SCNC: 10 MMOL/L (ref 8–16)
AST SERPL-CCNC: 22 U/L (ref 10–40)
BASOPHILS # BLD AUTO: 0.03 K/UL (ref 0–0.2)
BASOPHILS NFR BLD: 0.4 % (ref 0–1.9)
BILIRUB SERPL-MCNC: 0.4 MG/DL (ref 0.1–1)
BUN SERPL-MCNC: 14 MG/DL (ref 6–20)
CALCIUM SERPL-MCNC: 9.2 MG/DL (ref 8.7–10.5)
CHLORIDE SERPL-SCNC: 105 MMOL/L (ref 95–110)
CHOLEST SERPL-MCNC: 148 MG/DL (ref 120–199)
CHOLEST/HDLC SERPL: 3.9 {RATIO} (ref 2–5)
CO2 SERPL-SCNC: 24 MMOL/L (ref 23–29)
CREAT SERPL-MCNC: 0.7 MG/DL (ref 0.5–1.4)
DIFFERENTIAL METHOD: ABNORMAL
EOSINOPHIL # BLD AUTO: 0.2 K/UL (ref 0–0.5)
EOSINOPHIL NFR BLD: 3.3 % (ref 0–8)
ERYTHROCYTE [DISTWIDTH] IN BLOOD BY AUTOMATED COUNT: 12.6 % (ref 11.5–14.5)
EST. GFR  (AFRICAN AMERICAN): >60 ML/MIN/1.73 M^2
EST. GFR  (NON AFRICAN AMERICAN): >60 ML/MIN/1.73 M^2
GLUCOSE SERPL-MCNC: 70 MG/DL (ref 70–110)
HCT VFR BLD AUTO: 38.8 % (ref 37–48.5)
HDLC SERPL-MCNC: 38 MG/DL (ref 40–75)
HDLC SERPL: 25.7 % (ref 20–50)
HGB BLD-MCNC: 12.7 G/DL (ref 12–16)
IMM GRANULOCYTES # BLD AUTO: 0.04 K/UL (ref 0–0.04)
IMM GRANULOCYTES NFR BLD AUTO: 0.6 % (ref 0–0.5)
LDLC SERPL CALC-MCNC: 96 MG/DL (ref 63–159)
LYMPHOCYTES # BLD AUTO: 1.6 K/UL (ref 1–4.8)
LYMPHOCYTES NFR BLD: 23 % (ref 18–48)
MCH RBC QN AUTO: 28.4 PG (ref 27–31)
MCHC RBC AUTO-ENTMCNC: 32.7 G/DL (ref 32–36)
MCV RBC AUTO: 87 FL (ref 82–98)
MONOCYTES # BLD AUTO: 0.4 K/UL (ref 0.3–1)
MONOCYTES NFR BLD: 5.8 % (ref 4–15)
NEUTROPHILS # BLD AUTO: 4.7 K/UL (ref 1.8–7.7)
NEUTROPHILS NFR BLD: 66.9 % (ref 38–73)
NONHDLC SERPL-MCNC: 110 MG/DL
NRBC BLD-RTO: 0 /100 WBC
PLATELET # BLD AUTO: 270 K/UL (ref 150–350)
PMV BLD AUTO: 9.3 FL (ref 9.2–12.9)
POTASSIUM SERPL-SCNC: 4.3 MMOL/L (ref 3.5–5.1)
PROT SERPL-MCNC: 7 G/DL (ref 6–8.4)
RBC # BLD AUTO: 4.47 M/UL (ref 4–5.4)
SODIUM SERPL-SCNC: 139 MMOL/L (ref 136–145)
TRIGL SERPL-MCNC: 70 MG/DL (ref 30–150)
TSH SERPL DL<=0.005 MIU/L-ACNC: 1.15 UIU/ML (ref 0.4–4)
WBC # BLD AUTO: 7.03 K/UL (ref 3.9–12.7)

## 2020-08-27 PROCEDURE — 3008F PR BODY MASS INDEX (BMI) DOCUMENTED: ICD-10-PCS | Mod: CPTII,S$GLB,, | Performed by: OBSTETRICS & GYNECOLOGY

## 2020-08-27 PROCEDURE — 3008F BODY MASS INDEX DOCD: CPT | Mod: CPTII,S$GLB,, | Performed by: OBSTETRICS & GYNECOLOGY

## 2020-08-27 PROCEDURE — 99213 PR OFFICE/OUTPT VISIT, EST, LEVL III, 20-29 MIN: ICD-10-PCS | Mod: S$GLB,,, | Performed by: OBSTETRICS & GYNECOLOGY

## 2020-08-27 PROCEDURE — 99999 PR PBB SHADOW E&M-EST. PATIENT-LVL III: CPT | Mod: PBBFAC,,, | Performed by: OBSTETRICS & GYNECOLOGY

## 2020-08-27 PROCEDURE — 80061 LIPID PANEL: CPT

## 2020-08-27 PROCEDURE — 36415 COLL VENOUS BLD VENIPUNCTURE: CPT

## 2020-08-27 PROCEDURE — 86703 HIV-1/HIV-2 1 RESULT ANTBDY: CPT

## 2020-08-27 PROCEDURE — 85025 COMPLETE CBC W/AUTO DIFF WBC: CPT

## 2020-08-27 PROCEDURE — 86592 SYPHILIS TEST NON-TREP QUAL: CPT

## 2020-08-27 PROCEDURE — 80053 COMPREHEN METABOLIC PANEL: CPT

## 2020-08-27 PROCEDURE — 99999 PR PBB SHADOW E&M-EST. PATIENT-LVL III: ICD-10-PCS | Mod: PBBFAC,,, | Performed by: OBSTETRICS & GYNECOLOGY

## 2020-08-27 PROCEDURE — 99213 OFFICE O/P EST LOW 20 MIN: CPT | Mod: S$GLB,,, | Performed by: OBSTETRICS & GYNECOLOGY

## 2020-08-27 PROCEDURE — 84443 ASSAY THYROID STIM HORMONE: CPT

## 2020-08-27 PROCEDURE — 80074 ACUTE HEPATITIS PANEL: CPT

## 2020-08-27 NOTE — PROGRESS NOTES
Subjective:       Patient ID: Candace Tsang is a 25 y.o. female.    Chief Complaint:  Ovarian Cyst and Follow-up    History of Present Illness:  Follow-up  Associated symptoms include abdominal pain, anorexia and nausea. Pertinent negatives include no chest pain, chills, coughing, diaphoresis, fatigue, fever, headaches, rash or vomiting.   Pelvic Pain  The patient's primary symptoms include pelvic pain. The patient's pertinent negatives include no vaginal discharge. This is a recurrent problem. The current episode started yesterday. The problem occurs intermittently. The problem has been waxing and waning. The pain is mild. The problem affects the right side. She is not pregnant. Associated symptoms include abdominal pain, anorexia, dysuria, frequency, nausea and urgency. Pertinent negatives include no back pain, chills, constipation, diarrhea, discolored urine, fever, flank pain, headaches, hematuria, painful intercourse, rash or vomiting. The symptoms are aggravated by activity, heavy lifting, tactile pressure and urinating. She has tried acetaminophen and NSAIDs for the symptoms. The treatment provided mild relief. She is sexually active. No, her partner does not have an STD. She uses an IUD for contraception. Her menstrual history has been irregular. Her past medical history is significant for an abdominal surgery, a gynecological surgery, menorrhagia, metrorrhagia and ovarian cysts. There is no history of a  section, an ectopic pregnancy, endometriosis, herpes simplex, miscarriage, perineal abscess, PID, an STD, a terminated pregnancy or vaginosis.     26 y/o  presents for follow up of ruptured ovarian cyst. Two days ago she started having RLQ pain. Yesterday at 3 PM felt like she had to urinate (was worried about UTI) but she was unable to urinate very much. She then pressed on her pubic area and she felt acute severe pain in the RLQ radiating across her abdomen. She went to the ED  and was diagnosed with a right ruptured hemorrhagic cyst. She reports pain is improving but she is still very sore. Nausea has improved. She feels hungry but not full. She has had a Nexplanon in place since 10/24/2019.   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     GYN & OB History  Patient's last menstrual period was 2020 (approximate).   Date of Last Pap: No result found    OB History    Para Term  AB Living   0 0 0 0 0 0   SAB TAB Ectopic Multiple Live Births   0 0 0 0 0       Review of Systems  Review of Systems   Constitutional: Negative for chills, diaphoresis, fatigue and fever.   Respiratory: Negative for cough and shortness of breath.    Cardiovascular: Negative for chest pain and palpitations.   Gastrointestinal: Positive for abdominal pain, anorexia and nausea. Negative for constipation, diarrhea and vomiting.   Genitourinary: Positive for dysuria, frequency, menorrhagia, pelvic pain and urgency. Negative for dyspareunia, flank pain, hematuria, vaginal bleeding, vaginal discharge and vaginal pain.   Musculoskeletal: Negative for back pain.   Integumentary:  Negative for rash.   Neurological: Negative for headaches.   Psychiatric/Behavioral: Negative for depression.           Objective:    Physical Exam:   Constitutional: She is oriented to person, place, and time. She appears well-developed and well-nourished. No distress.    HENT:   Head: Normocephalic and atraumatic.    Eyes: EOM are normal.    Neck: Normal range of motion.     Pulmonary/Chest: Effort normal.          Genitourinary:    Genitourinary Comments: declined             Musculoskeletal: Normal range of motion and moves all extremeties.       Neurological: She is alert and oriented to person, place, and time.    Skin: No rash noted.    Psychiatric: She has a normal mood and affect. Her behavior is normal. Judgment and thought content normal.          Assessment:        1. Ruptured ovarian cyst    2.  Pelvic pain              Plan:      Candace was seen today for ovarian cyst and follow-up.    Diagnoses and all orders for this visit:    Ruptured ovarian cyst    Pelvic pain    Patients pain improving.   Continue NSAIDs.   Note for work for excuse through the weekend, can extend if still unable to return Monday.     No orders of the defined types were placed in this encounter.      Follow up if symptoms worsen or fail to improve.

## 2020-08-27 NOTE — TELEPHONE ENCOUNTER
----- Message from Siobhan Ji MD sent at 8/26/2020  7:51 PM CDT -----  Hi there,    This patient was seen in the ED tonight for possible ruptured ovarian cyst. Could you please schedule her for close follow up with Dr. Barclay?    Thanks so much!  - Hollie Ji        Left message for patient to give the office a call back.

## 2020-08-28 LAB
HAV IGM SERPL QL IA: NEGATIVE
HBV CORE IGM SERPL QL IA: NEGATIVE
HBV SURFACE AG SERPL QL IA: NEGATIVE
HCV AB SERPL QL IA: NEGATIVE
HIV 1+2 AB+HIV1 P24 AG SERPL QL IA: NEGATIVE
RPR SER QL: NORMAL

## 2020-09-04 ENCOUNTER — OFFICE VISIT (OUTPATIENT)
Dept: PSYCHIATRY | Facility: CLINIC | Age: 25
End: 2020-09-04
Payer: COMMERCIAL

## 2020-09-04 DIAGNOSIS — F31.9 BIPOLAR 1 DISORDER: Primary | ICD-10-CM

## 2020-09-04 DIAGNOSIS — F19.11 HISTORY OF DRUG ABUSE IN REMISSION: ICD-10-CM

## 2020-09-04 DIAGNOSIS — F41.1 GENERALIZED ANXIETY DISORDER WITH PANIC ATTACKS: ICD-10-CM

## 2020-09-04 DIAGNOSIS — F41.0 GENERALIZED ANXIETY DISORDER WITH PANIC ATTACKS: ICD-10-CM

## 2020-09-04 PROCEDURE — 99213 PR OFFICE/OUTPT VISIT, EST, LEVL III, 20-29 MIN: ICD-10-PCS | Mod: 95,,,

## 2020-09-04 PROCEDURE — 99213 OFFICE O/P EST LOW 20 MIN: CPT | Mod: 95,,,

## 2020-09-04 RX ORDER — SERTRALINE HYDROCHLORIDE 50 MG/1
150 TABLET, FILM COATED ORAL DAILY
Qty: 90 TABLET | Refills: 2 | Status: SHIPPED | OUTPATIENT
Start: 2020-09-04 | End: 2020-11-13

## 2020-09-04 RX ORDER — ARIPIPRAZOLE 5 MG/1
5 TABLET ORAL DAILY
Qty: 30 TABLET | Refills: 11 | Status: SHIPPED | OUTPATIENT
Start: 2020-09-04 | End: 2021-09-04

## 2020-09-04 NOTE — PROGRESS NOTES
"OUTPATIENT PSYCHIATRY FOLLOW UP VISIT    ENCOUNTER DATE:  9/4/2020  SITE:  [facility]  LENGTH OF SESSION:  35 minutes      CHIEF COMPLAINT:  No chief complaint on file.      HISTORY OF PRESENTING ILLNESS:  Candace Tsang is a 25 y.o. female with history of Bipolar d/o, diagnosed at 21 y/o, and  Generalized anxiety disorder who presents for follow up appointment        TELE PSYCHIATRY Disclaimer   *The patient was informed despite using HIPPA compliant technology there may be risks including security breach, technological failure, inability to perform a comprehensive physical exam which could delay or prevent an accurate diagnosis, and potential complications from treatment decisions rendered over a telemedical platform. The patient understands and consented to the use of tele-health service as being a safe measure to mitigate during COVID 19 Pandemic .  The patient was also informed of the relationship between the physician and patient and the respective role of any other health care provider with respect to management of the patient; and notified that the pt may decline to receive medical services by telemedicine and may withdraw from such care at any time.     Patient's Current location: [address] [place] [address]  exact physical address for OUTPT visit cannot be "home, work etc"  In Case of Emergency pts next of kin  Name: Akila Tsang  Phone number:  [phone]  Visit type: Virtual visit with synchronous audio and video  Total time spent with patient: 25 minutes        · Plan at last appointment on 7/31/2020:    Psych Med:   Continue Abilify 5 mg daily.    Continue zoloft 150 mg daily.   Stop lamotrigine.   Initiate mirtazipine 15 mg qhs.    Please obtain labwork- tsh, hba1c, lipid panel, etc.   Continue psychotherapy.    RTC 1 month.   Present to ED/call 911 for worsening sx of valery, AVH, SI, HI, any other emergency.            Interval history as told by Patient " "- & or family/friend/spouse/caregiver with pts permission  TSH, CMP, lipid panel, reviewed. No Hba1c by blood glucose not elevated.       Missed last weeks appointment due to rupture of ovarian cyst.   Pt states remeron not taken regularly as too sedating. However, things have greatly improved since last visit. Mood, sleep, anxiety have all improved. Thinks she is "more even keeled" than she had been. Denies SE of medication as far as she had noticed. Still in therapy ever 2 weeks. Tired from restarting work. Has noticed mild weight gain but it does not bother her. 3 year sobriety anniversary this weekend.  States SI thoughts of self harm continue to decrease in frequency- has had no urges to hurt herself or any plan for suicide. No HI, AVH, no delusions. NO further sx of caitlin       PSYCHIATRIC REVIEW OF SYSTEMS:(none/ yes- better/worse/stable/& what symptoms)    Symptoms of Depression: SI improved. Sleep improved.     Symptoms of Anxiety/ panic attacks: panic attacks while driving, anxiety improved      Symptoms of Caitlin/Hypomania: none. Resolved     Symptoms of psychosis: none  PANSS Positive /Negative    Sleep: poor    Appetite: don't get hungry/cant stop eating, coping mechanism, stress eating, emotional eating     Other: NA    Alcohol/drugs: patient has remained sober    Psychosocial stressors: recent breakup, difficult work schedule    Risk Parameters:  Patient reports no suicidal ideation  Patient reports no homicidal ideation  Patient reports no self-injurious behavior  Patient reports no violent behavior    PSYCHIATRIC MED REVIEW    Current psych meds  Medication side effects:  Pt experienced hypomania on lamotrigine   Medication compliance:  compliant    Previous psych meds trials  Zoloft, abilify, lamotrigine     PAST PSYCHIATRIC, MEDICAL, AND SOCIAL HISTORY REVIEWED  The patient's past medical, family and social history have been reviewed and updated as appropriate within the electronic medical record - " see encounter notes.    MEDICAL REVIEW OF SYSTEMS:  Review of Systems   Constitutional: Negative for chills and fever.   HENT: Negative for sore throat.    Eyes: Negative for double vision.   Respiratory: Negative for shortness of breath.    Cardiovascular: Negative for chest pain and palpitations.   Gastrointestinal: Negative for diarrhea, nausea and vomiting.   Musculoskeletal: Negative for falls.   Neurological: Negative for tremors and headaches.   Psychiatric/Behavioral: Negative for depression, hallucinations and substance abuse.         ALL MEDICATIONS:    Current Outpatient Medications:     ARIPiprazole (ABILIFY) 5 MG Tab, Take 1 tablet (5 mg total) by mouth once daily., Disp: 30 tablet, Rfl: 11    etonogestrel (IMPLANON) 68 mg Impl, by Subdermal route., Disp: , Rfl:     lamoTRIgine (LAMICTAL) 100 MG tablet, Take 1 tablet (100 mg total) by mouth once daily. After completing the previous 4 week taper from 25 mg to 50 mg, at the beginning of week 5, take 1 100 mg tablet nightly by mouth., Disp: 30 tablet, Rfl: 0    mirtazapine (REMERON) 15 MG tablet, Take 1 tablet (15 mg total) by mouth every evening., Disp: 30 tablet, Rfl: 2    sertraline (ZOLOFT) 50 MG tablet, Take 3 tablets (150 mg total) by mouth once daily., Disp: 90 tablet, Rfl: 2    Current Facility-Administered Medications:     etonogestrel subdermal device 68 mg, 68 mg, Subdermal, 1 time in Clinic/HOD, Etelvina Barclay DO    etonogestrel subdermal device 68 mg, 68 mg, , , Etelvina Barclay DO, 68 mg at 10/24/19 1415    ALLERGIES:  Review of patient's allergies indicates:  No Known Allergies    RELEVANT LABS/STUDIES:    Lab Results   Component Value Date    WBC 7.03 08/27/2020    HGB 12.7 08/27/2020    HCT 38.8 08/27/2020    MCV 87 08/27/2020     08/27/2020     BMP  Lab Results   Component Value Date     08/27/2020    K 4.3 08/27/2020     08/27/2020    CO2 24 08/27/2020    BUN 14 08/27/2020    CREATININE 0.7 08/27/2020     CALCIUM 9.2 08/27/2020    ANIONGAP 10 08/27/2020    ESTGFRAFRICA >60 08/27/2020    EGFRNONAA >60 08/27/2020     Lab Results   Component Value Date    ALT 20 08/27/2020    AST 22 08/27/2020    ALKPHOS 50 (L) 08/27/2020    BILITOT 0.4 08/27/2020     Lab Results   Component Value Date    TSH 1.154 08/27/2020     No results found for: LABA1C, HGBA1C    VITALS  There were no vitals filed for this visit.    PHYSICAL EXAM  General: well developed, well nourished  Neurologic:   Gait: not assessed d/t gait    Psychomotor signs:  No involuntary movements or tremor  AIMS: none    PSYCHIATRIC EXAM:     Mental Status Exam:  Appearance: unremarkable, age appropriate, hair now chin length and bleached  Behavior/Cooperation: normal, friendly and cooperative  Speech: normal tone, normal rate, normal pitch, normal volume  Language: uses words appropriately; NO aphasia or dysarthria  Mood: great  Affect: mildly elevated   Thought Process: normal and logical  Thought Content: normal, no suicidality, no homicidality, delusions, or paranoia  Level of Consciousness: Alert and Oriented x3  Memory:  Intact  Attention/concentration: appropriate for age/education.   Fund of Knowledge: appears adequate  Insight:  Intact  Judgment: Intact      IMPRESSION:    Candace Tsang is a 25 y.o. female with history of bipolar 1 d/o, history of polysubstance abuse who presents for f/u. Hypomania has resolved. Anxiety improving. No depression. Reviewed medicatiosn and SE. Answered all questions and concerns.     Status/Progress:  Based on the examination today, the patient's problem(s) is/are improving.  New problems have been presented today.     DIAGNOSES:  Bipolar 1 disorder  Hx of polysubstance aubse     PLAN:  Psych Med:   Continue Abilify 5 mg daily.    Continue zoloft 150 mg daily.   Reviewed labwork- still need hba1c all else WNL.   Pt taking mirtazine 15 mg for sleep   Continue psychotherapy.    RTC 2 month.   Present to ED/call  911 for worsening sx of valery, AVH, SI, HI, any other emergency.      Discussed with patient informed consent, risks vs. benefits, alternative treatments, side effect profile and the inherent unpredictability of individual responses to these treatments. Answered any questions patient may have had. The patient expresses understanding of the above and displays the capacity to agree with this current plan         RETURN TO CLINIC:   inderjit Gonzalez  9/4/2020

## 2020-09-10 NOTE — PROGRESS NOTES
09/10/2020: I reviewed and discussed this patient's treatment plan and diagnosis with Dr Salguero and agree with both at this time.

## 2020-09-23 ENCOUNTER — PATIENT MESSAGE (OUTPATIENT)
Dept: OBSTETRICS AND GYNECOLOGY | Facility: CLINIC | Age: 25
End: 2020-09-23

## 2020-10-02 ENCOUNTER — OFFICE VISIT (OUTPATIENT)
Dept: OBSTETRICS AND GYNECOLOGY | Facility: CLINIC | Age: 25
End: 2020-10-02
Payer: COMMERCIAL

## 2020-10-02 VITALS
WEIGHT: 270.06 LBS | HEIGHT: 72 IN | DIASTOLIC BLOOD PRESSURE: 70 MMHG | SYSTOLIC BLOOD PRESSURE: 100 MMHG | BODY MASS INDEX: 36.58 KG/M2

## 2020-10-02 DIAGNOSIS — R10.2 PELVIC PAIN: Primary | ICD-10-CM

## 2020-10-02 PROCEDURE — 99999 PR PBB SHADOW E&M-EST. PATIENT-LVL III: CPT | Mod: PBBFAC,,, | Performed by: OBSTETRICS & GYNECOLOGY

## 2020-10-02 PROCEDURE — 3008F PR BODY MASS INDEX (BMI) DOCUMENTED: ICD-10-PCS | Mod: CPTII,S$GLB,, | Performed by: OBSTETRICS & GYNECOLOGY

## 2020-10-02 PROCEDURE — 99213 PR OFFICE/OUTPT VISIT, EST, LEVL III, 20-29 MIN: ICD-10-PCS | Mod: S$GLB,,, | Performed by: OBSTETRICS & GYNECOLOGY

## 2020-10-02 PROCEDURE — 99213 OFFICE O/P EST LOW 20 MIN: CPT | Mod: S$GLB,,, | Performed by: OBSTETRICS & GYNECOLOGY

## 2020-10-02 PROCEDURE — 99999 PR PBB SHADOW E&M-EST. PATIENT-LVL III: ICD-10-PCS | Mod: PBBFAC,,, | Performed by: OBSTETRICS & GYNECOLOGY

## 2020-10-02 PROCEDURE — 3008F BODY MASS INDEX DOCD: CPT | Mod: CPTII,S$GLB,, | Performed by: OBSTETRICS & GYNECOLOGY

## 2020-10-02 NOTE — PROGRESS NOTES
"    Subjective:       Patient ID: Candace Tsang is a 25 y.o. female.    Chief Complaint:  Ovarian Cyst (left)    History of Present Illness:  Follow-up  Associated symptoms include abdominal pain, anorexia and nausea. Pertinent negatives include no chest pain, chills, coughing, diaphoresis, fatigue, fever, headaches, rash or vomiting.   Pelvic Pain  The patient's primary symptoms include pelvic pain. The patient's pertinent negatives include no vaginal discharge. This is a recurrent problem. The current episode started yesterday. The problem occurs intermittently. The problem has been waxing and waning. The pain is mild. The problem affects the right side. She is not pregnant. Associated symptoms include abdominal pain, anorexia, dysuria, frequency, nausea and urgency. Pertinent negatives include no back pain, chills, constipation, diarrhea, discolored urine, fever, flank pain, headaches, hematuria, painful intercourse, rash or vomiting. The symptoms are aggravated by activity, heavy lifting, tactile pressure and urinating. She has tried acetaminophen and NSAIDs for the symptoms. The treatment provided mild relief. She is sexually active. No, her partner does not have an STD. She uses an IUD for contraception. Her menstrual history has been irregular. Her past medical history is significant for an abdominal surgery, a gynecological surgery, menorrhagia, metrorrhagia and ovarian cysts. There is no history of a  section, an ectopic pregnancy, endometriosis, herpes simplex, miscarriage, perineal abscess, PID, an STD, a terminated pregnancy or vaginosis.     26 y/o  presents for follow up of ovarian cysts. She had right ruptured ovarian cyst over a month ago. About a week ago she felt the symptoms coming back. Reports a feeling of a "balloon" in her LLQ. She can feel discomfort/pressure if moving in certain positions. Reports was having urinary frequency but this has now resolved. She has had " UTIs in the past and does not feel like this is a UTI. Sometimes has gas pains but denies significant bowel symptoms. Denies straining with bowel movements.     GYN & OB History  Patient's last menstrual period was 10/02/2020.   Date of Last Pap: No result found    OB History    Para Term  AB Living   0 0 0 0 0 0   SAB TAB Ectopic Multiple Live Births   0 0 0 0 0       Review of Systems  Review of Systems   Constitutional: Negative for chills, diaphoresis, fatigue and fever.   Respiratory: Negative for cough and shortness of breath.    Cardiovascular: Negative for chest pain and palpitations.   Gastrointestinal: Positive for abdominal pain, anorexia and nausea. Negative for constipation, diarrhea and vomiting.   Genitourinary: Positive for dysuria, frequency, menorrhagia, pelvic pain and urgency. Negative for dysmenorrhea, dyspareunia, flank pain, hematuria, vaginal bleeding, vaginal discharge and vaginal pain.   Musculoskeletal: Negative for back pain.   Integumentary:  Negative for rash, breast mass, nipple discharge and breast skin changes.   Neurological: Negative for headaches.   Psychiatric/Behavioral: Negative for depression.   Breast: Negative for mass, mastodynia, nipple discharge and skin changes          Objective:    Physical Exam:   Constitutional: She is oriented to person, place, and time. She appears well-developed and well-nourished. No distress.    HENT:   Head: Normocephalic and atraumatic.    Eyes: EOM are normal.    Neck: Normal range of motion.     Pulmonary/Chest: Effort normal.          Genitourinary:    Genitourinary Comments: declined             Musculoskeletal: Normal range of motion and moves all extremeties.       Neurological: She is alert and oriented to person, place, and time.    Skin: No rash noted.    Psychiatric: She has a normal mood and affect. Her behavior is normal. Judgment and thought content normal.          Assessment:        No diagnosis found.           Plan:      Candace was seen today for ovarian cyst and follow-up.    Diagnoses and all orders for this visit:    Ruptured ovarian cyst    Pelvic pain  Symptoms have now resolved  Discussed US and she declines  Continue Nexplanon  Precautions reviewed     No orders of the defined types were placed in this encounter.      No follow-ups on file.

## 2020-10-05 ENCOUNTER — PATIENT MESSAGE (OUTPATIENT)
Dept: PSYCHIATRY | Facility: CLINIC | Age: 25
End: 2020-10-05

## 2020-10-19 ENCOUNTER — PATIENT MESSAGE (OUTPATIENT)
Dept: INTERNAL MEDICINE | Facility: CLINIC | Age: 25
End: 2020-10-19

## 2020-10-19 DIAGNOSIS — R51.9 HEAD ACHE: ICD-10-CM

## 2020-10-19 DIAGNOSIS — R11.0 NAUSEA: Primary | ICD-10-CM

## 2020-10-20 ENCOUNTER — TELEPHONE (OUTPATIENT)
Dept: INTERNAL MEDICINE | Facility: CLINIC | Age: 25
End: 2020-10-20

## 2020-10-20 DIAGNOSIS — R51.9 HEAD ACHE: ICD-10-CM

## 2020-10-20 DIAGNOSIS — R11.0 NAUSEA: Primary | ICD-10-CM

## 2020-10-20 DIAGNOSIS — Z20.822 EXPOSURE TO COVID-19 VIRUS: ICD-10-CM

## 2020-10-22 NOTE — TELEPHONE ENCOUNTER
Replaced order for Covid testing due to pt moving appt to another day and the order being accidentally cancelled.

## 2020-10-23 ENCOUNTER — LAB VISIT (OUTPATIENT)
Dept: PRIMARY CARE CLINIC | Facility: CLINIC | Age: 25
End: 2020-10-23
Attending: FAMILY MEDICINE
Payer: COMMERCIAL

## 2020-10-23 DIAGNOSIS — R11.0 NAUSEA: ICD-10-CM

## 2020-10-23 DIAGNOSIS — R51.9 HEAD ACHE: ICD-10-CM

## 2020-10-23 PROCEDURE — U0003 INFECTIOUS AGENT DETECTION BY NUCLEIC ACID (DNA OR RNA); SEVERE ACUTE RESPIRATORY SYNDROME CORONAVIRUS 2 (SARS-COV-2) (CORONAVIRUS DISEASE [COVID-19]), AMPLIFIED PROBE TECHNIQUE, MAKING USE OF HIGH THROUGHPUT TECHNOLOGIES AS DESCRIBED BY CMS-2020-01-R: HCPCS

## 2020-10-25 LAB — SARS-COV-2 RNA RESP QL NAA+PROBE: NOT DETECTED

## 2020-11-13 ENCOUNTER — OFFICE VISIT (OUTPATIENT)
Dept: PSYCHIATRY | Facility: CLINIC | Age: 25
End: 2020-11-13
Payer: COMMERCIAL

## 2020-11-13 DIAGNOSIS — F41.1 GENERALIZED ANXIETY DISORDER: ICD-10-CM

## 2020-11-13 DIAGNOSIS — F10.21 ALCOHOL USE DISORDER, MODERATE, IN SUSTAINED REMISSION: ICD-10-CM

## 2020-11-13 DIAGNOSIS — F31.9 BIPOLAR 1 DISORDER: Primary | ICD-10-CM

## 2020-11-13 PROCEDURE — 99213 PR OFFICE/OUTPT VISIT, EST, LEVL III, 20-29 MIN: ICD-10-PCS | Mod: 95,,,

## 2020-11-13 PROCEDURE — 99213 OFFICE O/P EST LOW 20 MIN: CPT | Mod: 95,,,

## 2020-11-13 RX ORDER — MIRTAZAPINE 7.5 MG/1
7.5 TABLET, FILM COATED ORAL NIGHTLY
Qty: 30 TABLET | Refills: 11 | Status: SHIPPED | OUTPATIENT
Start: 2020-11-13 | End: 2020-12-02 | Stop reason: SDUPTHER

## 2020-11-13 RX ORDER — SERTRALINE HYDROCHLORIDE 100 MG/1
200 TABLET, FILM COATED ORAL DAILY
Qty: 60 TABLET | Refills: 1 | Status: SHIPPED | OUTPATIENT
Start: 2020-11-13 | End: 2020-11-29

## 2020-11-13 NOTE — PROGRESS NOTES
"OUTPATIENT PSYCHIATRY FOLLOW UP VISIT    ENCOUNTER DATE:  11/13/2020  SITE:  [facility]  LENGTH OF SESSION:  35 minutes      CHIEF COMPLAINT:  No chief complaint on file.      HISTORY OF PRESENTING ILLNESS:  Candace Tsang is a 25 y.o. female with history of Bipolar d/o, diagnosed at 19 y/o, and  Generalized anxiety disorder who presents for follow up appointment        TELE PSYCHIATRY Disclaimer   *The patient was informed despite using HIPPA compliant technology there may be risks including security breach, technological failure, inability to perform a comprehensive physical exam which could delay or prevent an accurate diagnosis, and potential complications from treatment decisions rendered over a telemedical platform. The patient understands and consented to the use of tele-health service as being a safe measure to mitigate during COVID 19 Pandemic .  The patient was also informed of the relationship between the physician and patient and the respective role of any other health care provider with respect to management of the patient; and notified that the pt may decline to receive medical services by telemedicine and may withdraw from such care at any time.     Patient's Current location: [address] [place] [address]  exact physical address for OUTPT visit cannot be "home, work etc"  In Case of Emergency pts next of kin  Name: Akila Tsang  Phone number:  [phone]  Visit type: Virtual visit with synchronous audio and video  Total time spent with patient: 25 minutes        PLAN at last appointment :  Psych Med:   Continue Abilify 5 mg daily.    Continue zoloft 150 mg daily.   Reviewed labwork- still need hba1c all else WNL.   Pt taking mirtazine 15 mg for sleep   Continue psychotherapy.    RTC 2 month.   Present to ED/call 911 for worsening sx of valery, AVH, SI, HI, any other emergency.       Interval history as told by Patient - & or family/friend/spouse/caregiver " "with pts permission    Pt reports persistent uncontrolled anxiety x 2 months. States that the election was quite stressful, but her major stressor is work. States that it is a "nightmare" and that she feels "keyed up," restless, and edgy all the time. Reports racing thoughts of worry. Sleep has been fitful, but patient denies insomnia. Reports fatigue that she believes is due to anxiety and stress. Discussed work and the ways it has been exacerbating her anxiety. Reports driving is even more stressful than normal. Pt working long hours. Remains in therapy. Socially isolated due to demanding work schedule and fatigue. Does not feel depressed; rather, believes the chronic stress and anxiety are wearing on her. Thoughts of self harm/SI have ceased entirely which is a large improvement. Was not taking 15 mg remeron consistently as she found it made her too sleepy. Denies any SE from medications. No SI, HI, AVH. Remains sober and participating in [employer].     Last appointment:   Missed last weeks appointment due to rupture of ovarian cyst.   Pt states remeron not taken regularly as too sedating. However, things have greatly improved since last visit. Mood, sleep, anxiety have all improved. Thinks she is "more even keeled" than she had been. Denies SE of medication as far as she had noticed. Still in therapy ever 2 weeks. Tired from restarting work. Has noticed mild weight gain but it does not bother her. 3 year sobriety anniversary this weekend.  States SI thoughts of self harm continue to decrease in frequency- has had no urges to hurt herself or any plan for suicide. No HI, AVH, no delusions. NO further sx of caitlin       PSYCHIATRIC REVIEW OF SYSTEMS:(none/ yes- better/worse/stable/& what symptoms)    Symptoms of Depression: SI improved. Sleep improved.     Symptoms of Anxiety/ panic attacks: panic attacks while driving, anxiety improved      Symptoms of Caitlin/Hypomania: none. Resolved     Symptoms of psychosis: " none  PANSS Positive /Negative    Sleep: poor    Appetite: don't get hungry/cant stop eating, coping mechanism, stress eating, emotional eating     Other: NA    Alcohol/drugs: patient has remained sober    Psychosocial stressors: recent breakup, difficult work schedule    Risk Parameters:  Patient reports no suicidal ideation  Patient reports no homicidal ideation  Patient reports no self-injurious behavior  Patient reports no violent behavior    PSYCHIATRIC MED REVIEW    Current psych meds  Medication side effects:  Pt experienced hypomania on lamotrigine   Medication compliance:  compliant    Previous psych meds trials  Zoloft, abilify, lamotrigine     PAST PSYCHIATRIC, MEDICAL, AND SOCIAL HISTORY REVIEWED  The patient's past medical, family and social history have been reviewed and updated as appropriate within the electronic medical record - see encounter notes.    MEDICAL REVIEW OF SYSTEMS:  Review of Systems   Constitutional: Negative for chills and fever.   HENT: Negative for sore throat.    Eyes: Negative for double vision.   Respiratory: Negative for shortness of breath.    Cardiovascular: Negative for chest pain and palpitations.   Gastrointestinal: Negative for diarrhea, nausea and vomiting.   Musculoskeletal: Negative for falls.   Neurological: Negative for tremors and headaches.   Psychiatric/Behavioral: Negative for depression, hallucinations and substance abuse.         ALL MEDICATIONS:    Current Outpatient Medications:     ARIPiprazole (ABILIFY) 5 MG Tab, Take 1 tablet (5 mg total) by mouth once daily., Disp: 30 tablet, Rfl: 11    etonogestrel (IMPLANON) 68 mg Impl, by Subdermal route., Disp: , Rfl:     mirtazapine (REMERON) 15 MG tablet, Take 1 tablet (15 mg total) by mouth every evening., Disp: 30 tablet, Rfl: 2    sertraline (ZOLOFT) 50 MG tablet, Take 3 tablets (150 mg total) by mouth once daily., Disp: 90 tablet, Rfl: 2    Current Facility-Administered Medications:     etonogestrel  "subdermal device 68 mg, 68 mg, Subdermal, 1 time in Clinic/HOD, Etelvina Barclay DO    etonogestrel subdermal device 68 mg, 68 mg, , , Etelvina Barclay DO, 68 mg at 10/24/19 1415    ALLERGIES:  Review of patient's allergies indicates:  No Known Allergies    RELEVANT LABS/STUDIES:    Lab Results   Component Value Date    WBC 7.03 08/27/2020    HGB 12.7 08/27/2020    HCT 38.8 08/27/2020    MCV 87 08/27/2020     08/27/2020     BMP  Lab Results   Component Value Date     08/27/2020    K 4.3 08/27/2020     08/27/2020    CO2 24 08/27/2020    BUN 14 08/27/2020    CREATININE 0.7 08/27/2020    CALCIUM 9.2 08/27/2020    ANIONGAP 10 08/27/2020    ESTGFRAFRICA >60 08/27/2020    EGFRNONAA >60 08/27/2020     Lab Results   Component Value Date    ALT 20 08/27/2020    AST 22 08/27/2020    ALKPHOS 50 (L) 08/27/2020    BILITOT 0.4 08/27/2020     Lab Results   Component Value Date    TSH 1.154 08/27/2020     No results found for: LABA1C, HGBA1C    VITALS  There were no vitals filed for this visit.    PHYSICAL EXAM  General: well developed, well nourished  Neurologic:   Gait: not assessed d/t gait    Psychomotor signs:  No involuntary movements or tremor  AIMS: none    PSYCHIATRIC EXAM:     Mental Status Exam:  Appearance: unremarkable, age appropriate, hair now chin length and bleached  Behavior/Cooperation: normal, friendly and cooperative  Speech: normal tone, normal rate, normal pitch, normal volume  Language: uses words appropriately; NO aphasia or dysarthria  Mood: "not so good"   Affect: full, appropriate  Thought Process: normal and logical  Thought Content: normal, no suicidality, no homicidality, delusions, or paranoia  Level of Consciousness: Alert and Oriented x3  Memory:  Intact  Attention/concentration: appropriate for age/education.   Fund of Knowledge: appears adequate  Insight:  Intact  Judgment: Intact      IMPRESSION:    Candace Tsang is a 25 y.o. female with history of bipolar 1 " d/o, history of polysubstance abuse who presents for f/u. Complaint today of uncontrolled anxiety and stress due to work. Discussed increasing zoloft to maximum dosage and taking remeron more regularly- will try lower dose at 7.5 mg to see if patient can tolerate. No SI, HI, AVH.  Reviewed medicatiosn and SE. Answered all questions and concerns.     Status/Progress:  Based on the examination today, the patient's problem(s) is/are improving.  New problems have been presented today.     DIAGNOSES:  Bipolar 1 disorder  Hx of polysubstance aubse     PLAN:  Psych Med:   Continue Abilify 5 mg daily.    Increase zoloft to 200 mg daily.   Most recent labwork WNL.    Decrease mirtazipine to 7.5 mg qhs.    Continue psychotherapy.    RTC 1month.   Present to ED/call 911 for worsening sx of valery, AVH, SI, HI, any other emergency.      Discussed with patient informed consent, risks vs. benefits, alternative treatments, side effect profile and the inherent unpredictability of individual responses to these treatments. Answered any questions patient may have had. The patient expresses understanding of the above and displays the capacity to agree with this current plan         RETURN TO CLINIC: 1  month    Madeleine Gonzalez  11/13/2020

## 2020-11-27 NOTE — PROGRESS NOTES
11/27/2020: I reviewed and discussed this patient's treatment plan and diagnosis with Dr Salguero and agree with both at this time.

## 2020-12-01 ENCOUNTER — PATIENT MESSAGE (OUTPATIENT)
Dept: PSYCHIATRY | Facility: CLINIC | Age: 25
End: 2020-12-01

## 2020-12-02 ENCOUNTER — PATIENT MESSAGE (OUTPATIENT)
Dept: PSYCHIATRY | Facility: CLINIC | Age: 25
End: 2020-12-02

## 2020-12-02 DIAGNOSIS — F41.1 GENERALIZED ANXIETY DISORDER: ICD-10-CM

## 2020-12-02 DIAGNOSIS — F31.9 BIPOLAR 1 DISORDER: ICD-10-CM

## 2020-12-02 RX ORDER — MIRTAZAPINE 7.5 MG/1
15 TABLET, FILM COATED ORAL NIGHTLY
Qty: 60 TABLET | Refills: 11 | Status: SHIPPED | OUTPATIENT
Start: 2020-12-02 | End: 2021-01-14

## 2021-01-14 ENCOUNTER — OFFICE VISIT (OUTPATIENT)
Dept: PSYCHIATRY | Facility: CLINIC | Age: 26
End: 2021-01-14
Payer: COMMERCIAL

## 2021-01-14 DIAGNOSIS — F41.1 GENERALIZED ANXIETY DISORDER: Primary | ICD-10-CM

## 2021-01-14 DIAGNOSIS — F31.9 BIPOLAR 1 DISORDER: ICD-10-CM

## 2021-01-14 PROCEDURE — 99214 OFFICE O/P EST MOD 30 MIN: CPT | Mod: 95,,, | Performed by: NURSE PRACTITIONER

## 2021-01-14 PROCEDURE — 99214 PR OFFICE/OUTPT VISIT, EST, LEVL IV, 30-39 MIN: ICD-10-PCS | Mod: 95,,, | Performed by: NURSE PRACTITIONER

## 2021-01-14 PROCEDURE — 90833 PSYTX W PT W E/M 30 MIN: CPT | Mod: 95,,, | Performed by: NURSE PRACTITIONER

## 2021-01-14 PROCEDURE — 90833 PR PSYCHOTHERAPY W/PATIENT W/E&M, 30 MIN (ADD ON): ICD-10-PCS | Mod: 95,,, | Performed by: NURSE PRACTITIONER

## 2021-01-14 RX ORDER — HYDROXYZINE HYDROCHLORIDE 25 MG/1
25 TABLET, FILM COATED ORAL 2 TIMES DAILY PRN
Qty: 60 TABLET | Refills: 0 | Status: SHIPPED | OUTPATIENT
Start: 2021-01-14 | End: 2021-02-11

## 2021-01-14 RX ORDER — SERTRALINE HYDROCHLORIDE 100 MG/1
200 TABLET, FILM COATED ORAL DAILY
Qty: 180 TABLET | Refills: 0 | Status: SHIPPED | OUTPATIENT
Start: 2021-01-14

## 2021-01-24 ENCOUNTER — OFFICE VISIT (OUTPATIENT)
Dept: URGENT CARE | Facility: CLINIC | Age: 26
End: 2021-01-24
Payer: COMMERCIAL

## 2021-01-24 VITALS
WEIGHT: 250 LBS | OXYGEN SATURATION: 96 % | HEART RATE: 114 BPM | DIASTOLIC BLOOD PRESSURE: 86 MMHG | BODY MASS INDEX: 33.86 KG/M2 | TEMPERATURE: 99 F | RESPIRATION RATE: 16 BRPM | SYSTOLIC BLOOD PRESSURE: 132 MMHG | HEIGHT: 72 IN

## 2021-01-24 DIAGNOSIS — S99.911A ANKLE INJURY, RIGHT, INITIAL ENCOUNTER: ICD-10-CM

## 2021-01-24 DIAGNOSIS — S93.401A SPRAIN OF RIGHT ANKLE, UNSPECIFIED LIGAMENT, INITIAL ENCOUNTER: Primary | ICD-10-CM

## 2021-01-24 PROCEDURE — 3008F BODY MASS INDEX DOCD: CPT | Mod: CPTII,S$GLB,, | Performed by: STUDENT IN AN ORGANIZED HEALTH CARE EDUCATION/TRAINING PROGRAM

## 2021-01-24 PROCEDURE — 99213 OFFICE O/P EST LOW 20 MIN: CPT | Mod: S$GLB,,, | Performed by: STUDENT IN AN ORGANIZED HEALTH CARE EDUCATION/TRAINING PROGRAM

## 2021-01-24 PROCEDURE — 99213 PR OFFICE/OUTPT VISIT, EST, LEVL III, 20-29 MIN: ICD-10-PCS | Mod: S$GLB,,, | Performed by: STUDENT IN AN ORGANIZED HEALTH CARE EDUCATION/TRAINING PROGRAM

## 2021-01-24 PROCEDURE — 73610 XR ANKLE COMPLETE 3 VIEW RIGHT: ICD-10-PCS | Mod: FY,RT,S$GLB, | Performed by: RADIOLOGY

## 2021-01-24 PROCEDURE — 3008F PR BODY MASS INDEX (BMI) DOCUMENTED: ICD-10-PCS | Mod: CPTII,S$GLB,, | Performed by: STUDENT IN AN ORGANIZED HEALTH CARE EDUCATION/TRAINING PROGRAM

## 2021-01-24 PROCEDURE — 73610 X-RAY EXAM OF ANKLE: CPT | Mod: FY,RT,S$GLB, | Performed by: RADIOLOGY

## 2021-01-25 ENCOUNTER — TELEPHONE (OUTPATIENT)
Dept: URGENT CARE | Facility: CLINIC | Age: 26
End: 2021-01-25

## 2021-01-25 DIAGNOSIS — S93.401A SPRAIN OF RIGHT ANKLE, UNSPECIFIED LIGAMENT, INITIAL ENCOUNTER: Primary | ICD-10-CM

## 2021-02-01 ENCOUNTER — OFFICE VISIT (OUTPATIENT)
Dept: ORTHOPEDICS | Facility: CLINIC | Age: 26
End: 2021-02-01
Payer: COMMERCIAL

## 2021-02-01 VITALS — BODY MASS INDEX: 32.98 KG/M2 | HEIGHT: 72 IN

## 2021-02-01 DIAGNOSIS — S93.401A SPRAIN OF RIGHT ANKLE, UNSPECIFIED LIGAMENT, INITIAL ENCOUNTER: Primary | ICD-10-CM

## 2021-02-01 PROCEDURE — 99999 PR PBB SHADOW E&M-EST. PATIENT-LVL III: CPT | Mod: PBBFAC,,, | Performed by: PHYSICIAN ASSISTANT

## 2021-02-01 PROCEDURE — 99203 PR OFFICE/OUTPT VISIT, NEW, LEVL III, 30-44 MIN: ICD-10-PCS | Mod: S$GLB,,, | Performed by: PHYSICIAN ASSISTANT

## 2021-02-01 PROCEDURE — 99203 OFFICE O/P NEW LOW 30 MIN: CPT | Mod: S$GLB,,, | Performed by: PHYSICIAN ASSISTANT

## 2021-02-01 PROCEDURE — 1125F AMNT PAIN NOTED PAIN PRSNT: CPT | Mod: S$GLB,,, | Performed by: PHYSICIAN ASSISTANT

## 2021-02-01 PROCEDURE — 99999 PR PBB SHADOW E&M-EST. PATIENT-LVL III: ICD-10-PCS | Mod: PBBFAC,,, | Performed by: PHYSICIAN ASSISTANT

## 2021-02-01 PROCEDURE — 3008F BODY MASS INDEX DOCD: CPT | Mod: CPTII,S$GLB,, | Performed by: PHYSICIAN ASSISTANT

## 2021-02-01 PROCEDURE — 1125F PR PAIN SEVERITY QUANTIFIED, PAIN PRESENT: ICD-10-PCS | Mod: S$GLB,,, | Performed by: PHYSICIAN ASSISTANT

## 2021-02-01 PROCEDURE — 3008F PR BODY MASS INDEX (BMI) DOCUMENTED: ICD-10-PCS | Mod: CPTII,S$GLB,, | Performed by: PHYSICIAN ASSISTANT

## 2021-02-22 ENCOUNTER — OFFICE VISIT (OUTPATIENT)
Dept: ORTHOPEDICS | Facility: CLINIC | Age: 26
End: 2021-02-22
Payer: COMMERCIAL

## 2021-02-22 VITALS — BODY MASS INDEX: 32.98 KG/M2 | HEIGHT: 72 IN

## 2021-02-22 DIAGNOSIS — S93.401D SPRAIN OF RIGHT ANKLE, UNSPECIFIED LIGAMENT, SUBSEQUENT ENCOUNTER: Primary | ICD-10-CM

## 2021-02-22 PROCEDURE — 1126F PR PAIN SEVERITY QUANTIFIED, NO PAIN PRESENT: ICD-10-PCS | Mod: S$GLB,,, | Performed by: PHYSICIAN ASSISTANT

## 2021-02-22 PROCEDURE — 99999 PR PBB SHADOW E&M-EST. PATIENT-LVL III: CPT | Mod: PBBFAC,,, | Performed by: PHYSICIAN ASSISTANT

## 2021-02-22 PROCEDURE — 99213 PR OFFICE/OUTPT VISIT, EST, LEVL III, 20-29 MIN: ICD-10-PCS | Mod: S$GLB,,, | Performed by: PHYSICIAN ASSISTANT

## 2021-02-22 PROCEDURE — 3008F BODY MASS INDEX DOCD: CPT | Mod: CPTII,S$GLB,, | Performed by: PHYSICIAN ASSISTANT

## 2021-02-22 PROCEDURE — 99213 OFFICE O/P EST LOW 20 MIN: CPT | Mod: S$GLB,,, | Performed by: PHYSICIAN ASSISTANT

## 2021-02-22 PROCEDURE — 1126F AMNT PAIN NOTED NONE PRSNT: CPT | Mod: S$GLB,,, | Performed by: PHYSICIAN ASSISTANT

## 2021-02-22 PROCEDURE — 3008F PR BODY MASS INDEX (BMI) DOCUMENTED: ICD-10-PCS | Mod: CPTII,S$GLB,, | Performed by: PHYSICIAN ASSISTANT

## 2021-02-22 PROCEDURE — 99999 PR PBB SHADOW E&M-EST. PATIENT-LVL III: ICD-10-PCS | Mod: PBBFAC,,, | Performed by: PHYSICIAN ASSISTANT

## 2021-03-17 ENCOUNTER — CLINICAL SUPPORT (OUTPATIENT)
Dept: REHABILITATION | Facility: OTHER | Age: 26
End: 2021-03-17
Payer: COMMERCIAL

## 2021-03-17 DIAGNOSIS — R29.898 ANKLE WEAKNESS: ICD-10-CM

## 2021-03-17 DIAGNOSIS — M25.371 INSTABILITY OF RIGHT ANKLE JOINT: ICD-10-CM

## 2021-03-17 DIAGNOSIS — M25.571 ACUTE RIGHT ANKLE PAIN: ICD-10-CM

## 2021-03-17 PROCEDURE — 97161 PT EVAL LOW COMPLEX 20 MIN: CPT | Mod: PN | Performed by: PHYSICAL THERAPIST

## 2021-03-17 PROCEDURE — 97110 THERAPEUTIC EXERCISES: CPT | Mod: PN,97 | Performed by: PHYSICAL THERAPIST

## 2021-04-14 ENCOUNTER — DOCUMENTATION ONLY (OUTPATIENT)
Dept: REHABILITATION | Facility: OTHER | Age: 26
End: 2021-04-14

## 2021-04-14 ENCOUNTER — PATIENT MESSAGE (OUTPATIENT)
Dept: REHABILITATION | Facility: OTHER | Age: 26
End: 2021-04-14

## 2021-04-15 ENCOUNTER — PATIENT MESSAGE (OUTPATIENT)
Dept: REHABILITATION | Facility: OTHER | Age: 26
End: 2021-04-15

## 2021-04-26 ENCOUNTER — PATIENT MESSAGE (OUTPATIENT)
Dept: RESEARCH | Facility: HOSPITAL | Age: 26
End: 2021-04-26

## 2021-06-29 ENCOUNTER — HOSPITAL ENCOUNTER (OUTPATIENT)
Dept: RADIOLOGY | Facility: OTHER | Age: 26
Discharge: HOME | End: 2021-06-29
Attending: OBSTETRICS & GYNECOLOGY
Payer: COMMERCIAL

## 2021-06-29 ENCOUNTER — OFFICE VISIT (OUTPATIENT)
Dept: OBSTETRICS AND GYNECOLOGY | Facility: CLINIC | Age: 26
End: 2021-06-29
Payer: COMMERCIAL

## 2021-06-29 VITALS
WEIGHT: 293 LBS | SYSTOLIC BLOOD PRESSURE: 126 MMHG | BODY MASS INDEX: 39.68 KG/M2 | DIASTOLIC BLOOD PRESSURE: 74 MMHG | HEIGHT: 72 IN

## 2021-06-29 DIAGNOSIS — R10.2 PELVIC PAIN: Primary | ICD-10-CM

## 2021-06-29 DIAGNOSIS — R10.2 PELVIC PAIN: ICD-10-CM

## 2021-06-29 PROCEDURE — 76856 US EXAM PELVIC COMPLETE: CPT | Mod: 26,,, | Performed by: RADIOLOGY

## 2021-06-29 PROCEDURE — 3008F BODY MASS INDEX DOCD: CPT | Mod: CPTII,S$GLB,, | Performed by: OBSTETRICS & GYNECOLOGY

## 2021-06-29 PROCEDURE — 1125F AMNT PAIN NOTED PAIN PRSNT: CPT | Mod: S$GLB,,, | Performed by: OBSTETRICS & GYNECOLOGY

## 2021-06-29 PROCEDURE — 76856 US EXAM PELVIC COMPLETE: CPT | Mod: TC

## 2021-06-29 PROCEDURE — 76830 TRANSVAGINAL US NON-OB: CPT | Mod: 26,,, | Performed by: RADIOLOGY

## 2021-06-29 PROCEDURE — 3008F PR BODY MASS INDEX (BMI) DOCUMENTED: ICD-10-PCS | Mod: CPTII,S$GLB,, | Performed by: OBSTETRICS & GYNECOLOGY

## 2021-06-29 PROCEDURE — 87591 N.GONORRHOEAE DNA AMP PROB: CPT | Performed by: OBSTETRICS & GYNECOLOGY

## 2021-06-29 PROCEDURE — 76830 US PELVIS COMP WITH TRANSVAG NON-OB (XPD): ICD-10-PCS | Mod: 26,,, | Performed by: RADIOLOGY

## 2021-06-29 PROCEDURE — 76856 US PELVIS COMP WITH TRANSVAG NON-OB (XPD): ICD-10-PCS | Mod: 26,,, | Performed by: RADIOLOGY

## 2021-06-29 PROCEDURE — 1125F PR PAIN SEVERITY QUANTIFIED, PAIN PRESENT: ICD-10-PCS | Mod: S$GLB,,, | Performed by: OBSTETRICS & GYNECOLOGY

## 2021-06-29 PROCEDURE — 87086 URINE CULTURE/COLONY COUNT: CPT | Performed by: OBSTETRICS & GYNECOLOGY

## 2021-06-29 PROCEDURE — 99999 PR PBB SHADOW E&M-EST. PATIENT-LVL III: ICD-10-PCS | Mod: PBBFAC,,, | Performed by: OBSTETRICS & GYNECOLOGY

## 2021-06-29 PROCEDURE — 99214 OFFICE O/P EST MOD 30 MIN: CPT | Mod: S$GLB,,, | Performed by: OBSTETRICS & GYNECOLOGY

## 2021-06-29 PROCEDURE — 87491 CHLMYD TRACH DNA AMP PROBE: CPT | Performed by: OBSTETRICS & GYNECOLOGY

## 2021-06-29 PROCEDURE — 99214 PR OFFICE/OUTPT VISIT, EST, LEVL IV, 30-39 MIN: ICD-10-PCS | Mod: S$GLB,,, | Performed by: OBSTETRICS & GYNECOLOGY

## 2021-06-29 PROCEDURE — 99999 PR PBB SHADOW E&M-EST. PATIENT-LVL III: CPT | Mod: PBBFAC,,, | Performed by: OBSTETRICS & GYNECOLOGY

## 2021-06-29 RX ORDER — IBUPROFEN 800 MG/1
800 TABLET ORAL EVERY 8 HOURS PRN
Qty: 30 TABLET | Refills: 5 | Status: SHIPPED | OUTPATIENT
Start: 2021-06-29 | End: 2022-06-29

## 2021-06-30 LAB
BACTERIA UR CULT: NORMAL
BACTERIA UR CULT: NORMAL

## 2021-07-01 ENCOUNTER — PATIENT MESSAGE (OUTPATIENT)
Dept: OBSTETRICS AND GYNECOLOGY | Facility: CLINIC | Age: 26
End: 2021-07-01

## 2021-07-01 LAB
C TRACH DNA SPEC QL NAA+PROBE: NOT DETECTED
N GONORRHOEA DNA SPEC QL NAA+PROBE: NOT DETECTED

## 2021-07-15 ENCOUNTER — PATIENT MESSAGE (OUTPATIENT)
Dept: INTERNAL MEDICINE | Facility: CLINIC | Age: 26
End: 2021-07-15

## 2022-05-03 ENCOUNTER — PATIENT MESSAGE (OUTPATIENT)
Dept: OBSTETRICS AND GYNECOLOGY | Facility: CLINIC | Age: 27
End: 2022-05-03
Payer: COMMERCIAL

## 2022-05-03 DIAGNOSIS — Z30.9 ENCOUNTER FOR CONTRACEPTIVE MANAGEMENT, UNSPECIFIED TYPE: Primary | ICD-10-CM

## 2022-05-04 ENCOUNTER — PATIENT MESSAGE (OUTPATIENT)
Dept: OBSTETRICS AND GYNECOLOGY | Facility: CLINIC | Age: 27
End: 2022-05-04
Payer: COMMERCIAL

## 2022-06-13 ENCOUNTER — PROCEDURE VISIT (OUTPATIENT)
Dept: OBSTETRICS AND GYNECOLOGY | Facility: CLINIC | Age: 27
End: 2022-06-13
Payer: COMMERCIAL

## 2022-06-13 VITALS
WEIGHT: 277.75 LBS | SYSTOLIC BLOOD PRESSURE: 130 MMHG | DIASTOLIC BLOOD PRESSURE: 82 MMHG | HEIGHT: 72 IN | BODY MASS INDEX: 37.62 KG/M2

## 2022-06-13 DIAGNOSIS — Z30.46 ENCOUNTER FOR REMOVAL AND REINSERTION OF NEXPLANON: Primary | ICD-10-CM

## 2022-06-13 PROBLEM — Z97.5 NEXPLANON IN PLACE: Status: ACTIVE | Noted: 2022-06-13

## 2022-06-13 LAB
B-HCG UR QL: NEGATIVE
CTP QC/QA: YES

## 2022-06-13 PROCEDURE — 81025 POCT URINE PREGNANCY: ICD-10-PCS | Mod: S$GLB,,, | Performed by: NURSE PRACTITIONER

## 2022-06-13 PROCEDURE — 81025 URINE PREGNANCY TEST: CPT | Mod: S$GLB,,, | Performed by: NURSE PRACTITIONER

## 2022-06-13 PROCEDURE — 11981 INSERTION OF NEXPLANON: ICD-10-PCS | Mod: S$GLB,,, | Performed by: NURSE PRACTITIONER

## 2022-06-13 PROCEDURE — 11981 INSERTION DRUG DLVR IMPLANT: CPT | Mod: S$GLB,,, | Performed by: NURSE PRACTITIONER

## 2022-06-13 RX ORDER — ARIPIPRAZOLE 20 MG/1
1 TABLET ORAL DAILY
COMMUNITY
Start: 2022-03-14

## 2022-06-13 RX ORDER — ARIPIPRAZOLE 20 MG/1
20 TABLET ORAL DAILY
COMMUNITY
Start: 2022-03-14

## 2022-06-13 RX ORDER — ARIPIPRAZOLE 10 MG/1
10 TABLET ORAL DAILY
COMMUNITY
Start: 2022-01-11

## 2022-06-13 NOTE — PROCEDURES
Insertion of Nexplanon    Date/Time: 2022 10:40 AM  Performed by: Veronica Grimes NP  Authorized by: Veronica Grimes NP     Consent obtained:  Written  Consent given by:  Patient  Patient questions answered: yes    Patient agrees, verbalizes understanding, and wants to proceed: yes    Instructions and paperwork completed: yes    Pre-procedure timeout performed: yes    Prepped with: alcohol 70% and povidone-iodine    Local anesthetic:  Lidocaine without epinephrine  The site was cleaned and prepped in a sterile fashion: yes    Small stab incision was made in arm: yes    Left/right:  Left   68 mg etonogestrel 68 mg  Preloaded Implanon trocar was placed subdermally: yes    Visualization of implant was obtained: yes    Nexplanon was inserted and trocar removed: yes    Visualization of notch in stilette and palpitation of device: yes    Palpitation confirms placement by provider and patient: yes    Site was closed with steri-strips and pressure bandage applied: yes        CC: Nexplanon removal and reinsertion    Pt presents today for a nexplanon removal and reinsertion. Current implant does not  until October but is [place] a [place] of [place] and the insurance will not cover it.     Nexplanon palpated through the skin.  Area wiped with rubbing alcohol.  3 cc of 1% lidocaine without epinephrine was injected in the subcutaneous tissue.  The area was prepped with betadine.  A stabbing incision was made with an 11 blade scalpel.  The Implanon was identified and grasped with curved hemostat.  It was removed intact.  A bandage was placed over the incision site. Pt tolerated the procedure well.